# Patient Record
Sex: MALE | Race: WHITE | NOT HISPANIC OR LATINO | Employment: OTHER | URBAN - METROPOLITAN AREA
[De-identification: names, ages, dates, MRNs, and addresses within clinical notes are randomized per-mention and may not be internally consistent; named-entity substitution may affect disease eponyms.]

---

## 2024-07-09 ENCOUNTER — APPOINTMENT (OUTPATIENT)
Dept: GENERAL RADIOLOGY | Facility: HOSPITAL | Age: 84
DRG: 640 | End: 2024-07-09
Payer: MEDICARE

## 2024-07-09 ENCOUNTER — HOSPITAL ENCOUNTER (INPATIENT)
Facility: HOSPITAL | Age: 84
LOS: 16 days | Discharge: NURSING, REHAB OR LONG-TERM CARE | DRG: 640 | End: 2024-07-26
Attending: EMERGENCY MEDICINE | Admitting: INTERNAL MEDICINE
Payer: MEDICARE

## 2024-07-09 DIAGNOSIS — Z78.9 DECREASED ACTIVITIES OF DAILY LIVING (ADL): ICD-10-CM

## 2024-07-09 DIAGNOSIS — Z51.5 COMFORT MEASURES ONLY STATUS: ICD-10-CM

## 2024-07-09 DIAGNOSIS — R26.2 DIFFICULTY IN WALKING: ICD-10-CM

## 2024-07-09 DIAGNOSIS — N30.00 ACUTE CYSTITIS WITHOUT HEMATURIA: Primary | ICD-10-CM

## 2024-07-09 PROBLEM — N39.0 UTI (URINARY TRACT INFECTION): Status: ACTIVE | Noted: 2024-07-09

## 2024-07-09 LAB
ALBUMIN SERPL-MCNC: 3.4 G/DL (ref 3.5–5.2)
ALBUMIN/GLOB SERPL: 1.1 G/DL
ALP SERPL-CCNC: 95 U/L (ref 39–117)
ALT SERPL W P-5'-P-CCNC: 7 U/L (ref 1–41)
ANION GAP SERPL CALCULATED.3IONS-SCNC: 8.3 MMOL/L (ref 5–15)
AST SERPL-CCNC: 13 U/L (ref 1–40)
BACTERIA UR QL AUTO: ABNORMAL /HPF
BASOPHILS # BLD AUTO: 0.03 10*3/MM3 (ref 0–0.2)
BASOPHILS NFR BLD AUTO: 0.5 % (ref 0–1.5)
BILIRUB SERPL-MCNC: <0.2 MG/DL (ref 0–1.2)
BILIRUB UR QL STRIP: NEGATIVE
BUN SERPL-MCNC: 27 MG/DL (ref 8–23)
BUN/CREAT SERPL: 31 (ref 7–25)
CALCIUM SPEC-SCNC: 8.8 MG/DL (ref 8.6–10.5)
CHLORIDE SERPL-SCNC: 105 MMOL/L (ref 98–107)
CLARITY UR: ABNORMAL
CO2 SERPL-SCNC: 27.7 MMOL/L (ref 22–29)
COLOR UR: YELLOW
CREAT SERPL-MCNC: 0.87 MG/DL (ref 0.76–1.27)
DEPRECATED RDW RBC AUTO: 51.4 FL (ref 37–54)
EGFRCR SERPLBLD CKD-EPI 2021: 85.6 ML/MIN/1.73
EOSINOPHIL # BLD AUTO: 0.07 10*3/MM3 (ref 0–0.4)
EOSINOPHIL NFR BLD AUTO: 1.1 % (ref 0.3–6.2)
ERYTHROCYTE [DISTWIDTH] IN BLOOD BY AUTOMATED COUNT: 14.1 % (ref 12.3–15.4)
GLOBULIN UR ELPH-MCNC: 3.1 GM/DL
GLUCOSE SERPL-MCNC: 104 MG/DL (ref 65–99)
GLUCOSE UR STRIP-MCNC: NEGATIVE MG/DL
HCT VFR BLD AUTO: 34.6 % (ref 37.5–51)
HGB BLD-MCNC: 11.1 G/DL (ref 13–17.7)
HGB UR QL STRIP.AUTO: ABNORMAL
HOLD SPECIMEN: NORMAL
HOLD SPECIMEN: NORMAL
HYALINE CASTS UR QL AUTO: ABNORMAL /LPF
IMM GRANULOCYTES # BLD AUTO: 0.01 10*3/MM3 (ref 0–0.05)
IMM GRANULOCYTES NFR BLD AUTO: 0.2 % (ref 0–0.5)
KETONES UR QL STRIP: NEGATIVE
LEUKOCYTE ESTERASE UR QL STRIP.AUTO: ABNORMAL
LYMPHOCYTES # BLD AUTO: 1.62 10*3/MM3 (ref 0.7–3.1)
LYMPHOCYTES NFR BLD AUTO: 25.5 % (ref 19.6–45.3)
MAGNESIUM SERPL-MCNC: 2.2 MG/DL (ref 1.6–2.4)
MCH RBC QN AUTO: 31.8 PG (ref 26.6–33)
MCHC RBC AUTO-ENTMCNC: 32.1 G/DL (ref 31.5–35.7)
MCV RBC AUTO: 99.1 FL (ref 79–97)
MONOCYTES # BLD AUTO: 0.78 10*3/MM3 (ref 0.1–0.9)
MONOCYTES NFR BLD AUTO: 12.3 % (ref 5–12)
NEUTROPHILS NFR BLD AUTO: 3.84 10*3/MM3 (ref 1.7–7)
NEUTROPHILS NFR BLD AUTO: 60.4 % (ref 42.7–76)
NITRITE UR QL STRIP: POSITIVE
NRBC BLD AUTO-RTO: 0 /100 WBC (ref 0–0.2)
PH UR STRIP.AUTO: 6.5 [PH] (ref 5–8)
PLATELET # BLD AUTO: 180 10*3/MM3 (ref 140–450)
PMV BLD AUTO: 9.7 FL (ref 6–12)
POTASSIUM SERPL-SCNC: 4.4 MMOL/L (ref 3.5–5.2)
PROT SERPL-MCNC: 6.5 G/DL (ref 6–8.5)
PROT UR QL STRIP: ABNORMAL
RBC # BLD AUTO: 3.49 10*6/MM3 (ref 4.14–5.8)
RBC # UR STRIP: ABNORMAL /HPF
REF LAB TEST METHOD: ABNORMAL
SODIUM SERPL-SCNC: 141 MMOL/L (ref 136–145)
SP GR UR STRIP: 1.02 (ref 1–1.03)
SQUAMOUS #/AREA URNS HPF: ABNORMAL /HPF
TROPONIN T SERPL HS-MCNC: 38 NG/L
TROPONIN T SERPL HS-MCNC: 43 NG/L
UROBILINOGEN UR QL STRIP: ABNORMAL
WBC # UR STRIP: ABNORMAL /HPF
WBC NRBC COR # BLD AUTO: 6.35 10*3/MM3 (ref 3.4–10.8)
WHOLE BLOOD HOLD COAG: NORMAL
WHOLE BLOOD HOLD SPECIMEN: NORMAL

## 2024-07-09 PROCEDURE — 87186 SC STD MICRODIL/AGAR DIL: CPT

## 2024-07-09 PROCEDURE — 80053 COMPREHEN METABOLIC PANEL: CPT | Performed by: EMERGENCY MEDICINE

## 2024-07-09 PROCEDURE — 93005 ELECTROCARDIOGRAM TRACING: CPT

## 2024-07-09 PROCEDURE — 85025 COMPLETE CBC W/AUTO DIFF WBC: CPT | Performed by: EMERGENCY MEDICINE

## 2024-07-09 PROCEDURE — 93010 ELECTROCARDIOGRAM REPORT: CPT | Performed by: SPECIALIST

## 2024-07-09 PROCEDURE — 83735 ASSAY OF MAGNESIUM: CPT | Performed by: EMERGENCY MEDICINE

## 2024-07-09 PROCEDURE — 82607 VITAMIN B-12: CPT | Performed by: INTERNAL MEDICINE

## 2024-07-09 PROCEDURE — 71045 X-RAY EXAM CHEST 1 VIEW: CPT

## 2024-07-09 PROCEDURE — 84484 ASSAY OF TROPONIN QUANT: CPT | Performed by: EMERGENCY MEDICINE

## 2024-07-09 PROCEDURE — 93005 ELECTROCARDIOGRAM TRACING: CPT | Performed by: EMERGENCY MEDICINE

## 2024-07-09 PROCEDURE — 99223 1ST HOSP IP/OBS HIGH 75: CPT | Performed by: STUDENT IN AN ORGANIZED HEALTH CARE EDUCATION/TRAINING PROGRAM

## 2024-07-09 PROCEDURE — 87086 URINE CULTURE/COLONY COUNT: CPT

## 2024-07-09 PROCEDURE — 84484 ASSAY OF TROPONIN QUANT: CPT

## 2024-07-09 PROCEDURE — 81001 URINALYSIS AUTO W/SCOPE: CPT | Performed by: EMERGENCY MEDICINE

## 2024-07-09 PROCEDURE — 99285 EMERGENCY DEPT VISIT HI MDM: CPT

## 2024-07-09 PROCEDURE — 36415 COLL VENOUS BLD VENIPUNCTURE: CPT | Performed by: EMERGENCY MEDICINE

## 2024-07-09 PROCEDURE — 84443 ASSAY THYROID STIM HORMONE: CPT | Performed by: INTERNAL MEDICINE

## 2024-07-09 RX ORDER — FERROUS GLUCONATE 324(38)MG
324 TABLET ORAL DAILY
COMMUNITY
Start: 2024-02-06 | End: 2024-07-26 | Stop reason: HOSPADM

## 2024-07-09 RX ORDER — SODIUM CHLORIDE 0.9 % (FLUSH) 0.9 %
10 SYRINGE (ML) INJECTION AS NEEDED
Status: DISCONTINUED | OUTPATIENT
Start: 2024-07-09 | End: 2024-07-16

## 2024-07-09 RX ORDER — PANTOPRAZOLE SODIUM 20 MG/1
20 TABLET, DELAYED RELEASE ORAL
COMMUNITY
Start: 2024-05-17

## 2024-07-09 RX ORDER — BUDESONIDE AND FORMOTEROL FUMARATE DIHYDRATE 160; 4.5 UG/1; UG/1
2 AEROSOL RESPIRATORY (INHALATION) 2 TIMES DAILY
COMMUNITY
Start: 2024-02-06 | End: 2024-07-26 | Stop reason: HOSPADM

## 2024-07-09 RX ORDER — METOPROLOL SUCCINATE 25 MG/1
25 TABLET, EXTENDED RELEASE ORAL DAILY
COMMUNITY
Start: 2024-05-17 | End: 2024-07-26 | Stop reason: HOSPADM

## 2024-07-09 RX ORDER — GABAPENTIN 100 MG/1
100 CAPSULE ORAL
COMMUNITY
Start: 2024-06-07

## 2024-07-09 RX ORDER — QUETIAPINE FUMARATE 50 MG/1
1.5 TABLET, FILM COATED ORAL NIGHTLY
COMMUNITY
Start: 2024-05-13

## 2024-07-09 NOTE — ED PROVIDER NOTES
Time: 6:12 PM EDT  Date of encounter:  7/9/2024  Independent Historian/Clinical History and Information was obtained by:   Family    History is limited by: N/A    Chief Complaint: Weakness      History of Present Illness:  Patient is a 84 y.o. year old male who presents to the emergency department for evaluation of weakness.  Patient presents with family from home.  Patient currently lives with a caregiver and family visits him but is unable to fully care for him in their home.  Patient has started to have a decreased appetite and has been increasingly confused.  His grandson is his POA and just sold his home therefore the patient does not have a home to go to.  Family is requesting admission for for placement in a nursing facility where he can receive proper care.  Denies fever ENEDINA Gardner FNP-C/ENP    Per nurse and family member states that the POA sold his house and patient lives with his 76-year-old caregiver.  She is no longer able to take care of the patient.  Family member states that he has not been eating as much in has not been taking his medications.  Patient has a history of dementia and is only aware to self.  He did tell his family members and nurse that he misses his wife who passed away and has thoughts of wanting to kill himself but no plan.  He denied SI to me when asked.  Patient denies pain.     HPI    Patient Care Team  Primary Care Provider: Provider, No Known    Past Medical History:     No Known Allergies  Past Medical History:   Diagnosis Date    Dementia      History reviewed. No pertinent surgical history.  History reviewed. No pertinent family history.    Home Medications:  Prior to Admission medications    Not on File        Social History:          Review of Systems:  Review of Systems   Constitutional:  Positive for activity change, appetite change and unexpected weight change.   HENT: Negative.     Eyes: Negative.    Respiratory: Negative.     Cardiovascular: Negative.     Gastrointestinal: Negative.    Endocrine: Negative.    Genitourinary: Negative.    Musculoskeletal: Negative.    Skin: Negative.    Allergic/Immunologic: Negative.    Neurological:  Positive for weakness.   Hematological: Negative.    Psychiatric/Behavioral:  Positive for confusion.         Physical Exam:  BP 99/60 (BP Location: Left arm, Patient Position: Sitting)   Pulse 69   Temp 98.2 °F (36.8 °C) (Oral)   Resp 16   SpO2 99%     Physical Exam  Vitals and nursing note reviewed.   Constitutional:       Appearance: Normal appearance.   HENT:      Head: Normocephalic and atraumatic.      Nose: Nose normal.      Mouth/Throat:      Mouth: Mucous membranes are moist.   Eyes:      Extraocular Movements: Extraocular movements intact.      Conjunctiva/sclera: Conjunctivae normal.      Pupils: Pupils are equal, round, and reactive to light.   Cardiovascular:      Rate and Rhythm: Normal rate and regular rhythm.      Heart sounds: Normal heart sounds.   Pulmonary:      Effort: Pulmonary effort is normal.      Breath sounds: Normal breath sounds.   Musculoskeletal:         General: Normal range of motion.      Cervical back: Normal range of motion and neck supple.   Skin:     General: Skin is warm and dry.      Coloration: Skin is not cyanotic.   Neurological:      General: No focal deficit present.      Mental Status: He is alert and oriented to person, place, and time.   Psychiatric:         Attention and Perception: Attention and perception normal.         Mood and Affect: Mood normal.         Behavior: Behavior normal.               Procedures:  Procedures      Medical Decision Making:      Comorbidities that affect care:    Anemia, Chronic Kidney Disease    External Notes reviewed:    Previous Clinic Note: Office visit with internal medicine 5/29/2024      The following orders were placed and all results were independently analyzed by me:  Orders Placed This Encounter   Procedures    Blood Culture - Blood,    Blood  Culture - Blood,    Urine Culture - Urine,    XR Chest 1 View    [place] Draw    Comprehensive Metabolic Panel    Single High Sensitivity Troponin T    Magnesium    Urinalysis With Microscopic If Indicated (No Culture) - Urine, Clean Catch    CBC Auto Differential    Single High Sensitivity Troponin T    Urinalysis, Microscopic Only - Urine, Clean Catch    Lactic Acid, Plasma    NPO Diet NPO Type: Strict NPO    Undress & Gown    Continuous Pulse Oximetry    Vital Signs    Orthostatic Blood Pressure    Inpatient Hospitalist Consult    Oxygen Therapy- Nasal Cannula; Titrate 1-6 LPM Per SpO2; 90 - 95%    POC Glucose Once    ECG 12 Lead ED Triage Standing Order; Weak / Dizzy / AMS    Insert Peripheral IV    Initiate Observation Status    Fall Precautions    CBC & Differential    Green Top (Gel)    Lavender Top    Gold Top - SST    Light Blue Top       Medications Given in the Emergency Department:  Medications   sodium chloride 0.9 % flush 10 mL (has no administration in time range)   lactated ringers bolus 500 mL (has no administration in time range)   cefTRIAXone (ROCEPHIN) 1,000 mg in sodium chloride 0.9 % 100 mL IVPB-VTB (has no administration in time range)   sertraline (ZOLOFT) tablet 50 mg (has no administration in time range)        ED Course:    ED Course as of 07/09/24 2350   Tue Jul 09, 2024 1810 -- PROVIDER IN TRIAGE NOTE ---    The patient was evaluated by me, ENEDINA Walters, in triage. Orders were placed and the patient is currently awaiting disposition.    [CB]   2128 Grandson who is the POA came into the room while I was walking into the room to assess the patient.  Another family member started getting a fight. [AJ]      ED Course User Index  [AJ] Leona Pang PA-C  [CB] Regina Myrick APRN       Labs:    Lab Results (last 24 hours)       Procedure Component Value Units Date/Time    CBC & Differential [[id number]]  (Abnormal) Collected: 07/09/24 1825    Specimen: Blood from Arm,  Right Updated: 07/09/24 1838    Narrative:      The following orders were created for panel order CBC & Differential.  Procedure                               Abnormality         Status                     ---------                               -----------         ------                     CBC Auto Differential[[id number]]        Abnormal            Final result                 Please view results for these tests on the individual orders.    Comprehensive Metabolic Panel [[id number]]  (Abnormal) Collected: 07/09/24 1825    Specimen: Blood from Arm, Right Updated: 07/09/24 1857     Glucose 104 mg/dL      BUN 27 mg/dL      Creatinine 0.87 mg/dL      Sodium 141 mmol/L      Potassium 4.4 mmol/L      Chloride 105 mmol/L      CO2 27.7 mmol/L      Calcium 8.8 mg/dL      Total Protein 6.5 g/dL      Albumin 3.4 g/dL      ALT (SGPT) 7 U/L      AST (SGOT) 13 U/L      Alkaline Phosphatase 95 U/L      Total Bilirubin <0.2 mg/dL      Globulin 3.1 gm/dL      A/G Ratio 1.1 g/dL      BUN/Creatinine Ratio 31.0     Anion Gap 8.3 mmol/L      eGFR 85.6 mL/min/1.73     Narrative:      GFR Normal >60  Chronic Kidney Disease <60  Kidney Failure <15    The GFR formula is only valid for adults with stable renal function between ages 18 and 70.    Single High Sensitivity Troponin T [[id number]]  (Abnormal) Collected: 07/09/24 1825    Specimen: Blood from Arm, Right Updated: 07/09/24 1857     HS Troponin T 43 ng/L     Narrative:      High Sensitive Troponin T Reference Range:  <14.0 ng/L- Negative Female for AMI  <22.0 ng/L- Negative Male for AMI  >=14 - Abnormal Female indicating possible myocardial injury.  >=22 - Abnormal Male indicating possible myocardial injury.   Clinicians would have to utilize clinical acumen, EKG, Troponin, and serial changes to determine if it is an Acute Myocardial Infarction or myocardial injury due to an underlying chronic condition.         Magnesium [[id number]]  (Normal) Collected: 07/09/24 1825    Specimen:  Blood from Arm, Right Updated: 07/09/24 1857     Magnesium 2.2 mg/dL     CBC Auto Differential [[id number]]  (Abnormal) Collected: 07/09/24 1825    Specimen: Blood from Arm, Right Updated: 07/09/24 1838     WBC 6.35 10*3/mm3      RBC 3.49 10*6/mm3      Hemoglobin 11.1 g/dL      Hematocrit 34.6 %      MCV 99.1 fL      MCH 31.8 pg      MCHC 32.1 g/dL      RDW 14.1 %      RDW-SD 51.4 fl      MPV 9.7 fL      Platelets 180 10*3/mm3      Neutrophil % 60.4 %      Lymphocyte % 25.5 %      Monocyte % 12.3 %      Eosinophil % 1.1 %      Basophil % 0.5 %      Immature Grans % 0.2 %      Neutrophils, Absolute 3.84 10*3/mm3      Lymphocytes, Absolute 1.62 10*3/mm3      Monocytes, Absolute 0.78 10*3/mm3      Eosinophils, Absolute 0.07 10*3/mm3      Basophils, Absolute 0.03 10*3/mm3      Immature Grans, Absolute 0.01 10*3/mm3      nRBC 0.0 /100 WBC     Urinalysis With Microscopic If Indicated (No Culture) - Urine, Clean Catch [[id number]]  (Abnormal) Collected: 07/09/24 2133    Specimen: Urine, Clean Catch Updated: 07/09/24 2148     Color, UA Yellow     Appearance, UA Cloudy     pH, UA 6.5     Specific Gravity, UA 1.021     Glucose, UA Negative     Ketones, UA Negative     Bilirubin, UA Negative     Blood, UA Moderate (2+)     Protein, UA 30 mg/dL (1+)     Leuk Esterase, UA Large (3+)     Nitrite, UA Positive     Urobilinogen, UA 0.2 E.U./dL    Urinalysis, Microscopic Only - Urine, Clean Catch [[id number]]  (Abnormal) Collected: 07/09/24 2133    Specimen: Urine, Clean Catch Updated: 07/09/24 2148     RBC, UA Too Numerous to Count /HPF      WBC, UA Too Numerous to Count /HPF      Bacteria, UA 4+ /HPF      Squamous Epithelial Cells, UA 0-2 /HPF      Hyaline Casts, UA 0-2 /LPF      Methodology Automated Microscopy    Urine Culture - Urine, Urine, Clean Catch [[id number]] Collected: 07/09/24 2133    Specimen: Urine, Clean Catch Updated: 07/09/24 2234    Single High Sensitivity Troponin T [[id number]]  (Abnormal) Collected: 07/09/24 2202     Specimen: Blood from Arm, Right Updated: 07/09/24 2237     HS Troponin T 38 ng/L     Narrative:      High Sensitive Troponin T Reference Range:  <14.0 ng/L- Negative Female for AMI  <22.0 ng/L- Negative Male for AMI  >=14 - Abnormal Female indicating possible myocardial injury.  >=22 - Abnormal Male indicating possible myocardial injury.   Clinicians would have to utilize clinical acumen, EKG, Troponin, and serial changes to determine if it is an Acute Myocardial Infarction or myocardial injury due to an underlying chronic condition.                  Imaging:    XR Chest 1 View    Result Date: 7/9/2024  XR CHEST 1 VW Date of Exam: 7/9/2024 7:19 PM EDT Indication: Weak/Dizzy/AMS triage protocol Comparison: None available. Findings: There are no definite airspace consolidations. No hyperinflation. Innumerable tiny punctate bilateral calcified granulomas. There is biapical pleural thickening. No pleural fluid. No pneumothorax. The pulmonary vasculature appears within normal limits. The cardiac and mediastinal silhouette appear unremarkable. No acute osseous abnormality identified.     Impression: No acute pulmonary process Electronically Signed: Tomasz Harris MD  7/9/2024 7:37 PM EDT  Workstation ID: [id number]       Differential Diagnosis and Discussion:    Metabolic: Differential diagnosis includes but is not limited to hypertension, hyperglycemia, hyperkalemia, hypocalcemia, metabolic acidosis, hypokalemia, hypoglycemia, malnutrition, hypothyroidism, hyperthyroidism, and adrenal insufficiency.   Weakness: Based on the patient's history, signs, and symptoms, the diffential diagnosis includes but is not limited to meningitis, stroke, sepsis, subarachnoid hemorrhage, intracranial bleeding, encephalitis, acute uti, dehydration, MS, myasthenia gravis, [place], migraine variant, neuromuscular disorders vertigo, electrolyte imbalance, and metabolic disorders.    All labs were reviewed and interpreted by me.  All  X-rays impressions were independently interpreted by me.  EKG was interpreted by me.  EKG was interpreted by supervising attending.    MDM     Amount and/or Complexity of Data Reviewed  Clinical lab tests: reviewed  Decide to obtain previous medical records or to obtain history from someone other than the patient: yes                 Patient Care Considerations:    SEPSIS was considered but is NOT present in the emergency department as SIRS criteria is not present.      Consultants/Shared Management Plan:    Hospitalist: I have discussed the case with Dr. Burgess who agrees to accept the patient for admission.    Social Determinants of Health:    Patient has presented with family members who are responsible, reliable and will ensure follow up care.      Disposition and Care Coordination:    Admit:   Through independent evaluation of the patient's history, physical, and imperical data, the patient meets criteria for inpatient admission to the hospital.        Final diagnoses:   Acute cystitis without hematuria        ED Disposition       ED Disposition   Decision to Admit    Condition   --    Comment   Level of Care: Remote Telemetry [26]   Diagnosis: UTI (urinary tract infection) [383223]                 This medical record created using voice recognition software.             Leona Pang PA-C  07/09/24 3168

## 2024-07-09 NOTE — ED NOTES
Pt presents to ed with family reporting he has not been eating or taking his medications and that is losing weight. Pt has dementia, pt is unable to perform ADL's. Pt is assist x2. Pt is incontinent. Pt is alert and oriented to self only.     Family reports he is needing 24 hour care that they are unable to provide. Pt had a caregiver but they are unable to provide 24 hour care due to their age.     Family reports pt's grandson is his POA and has sold the pt's home and is spending all his money and won't help pt. Family is requesting to speak with SW. Family reported they have also called [facility] and a case is being opened.     Notified SW.

## 2024-07-10 ENCOUNTER — APPOINTMENT (OUTPATIENT)
Dept: CT IMAGING | Facility: HOSPITAL | Age: 84
DRG: 640 | End: 2024-07-10
Payer: MEDICARE

## 2024-07-10 LAB
QT INTERVAL: 386 MS
QTC INTERVAL: 432 MS
TSH SERPL DL<=0.05 MIU/L-ACNC: 2.55 UIU/ML (ref 0.27–4.2)
VIT B12 BLD-MCNC: 717 PG/ML (ref 211–946)

## 2024-07-10 PROCEDURE — 99232 SBSQ HOSP IP/OBS MODERATE 35: CPT | Performed by: INTERNAL MEDICINE

## 2024-07-10 PROCEDURE — 70450 CT HEAD/BRAIN W/O DYE: CPT

## 2024-07-10 RX ORDER — QUETIAPINE FUMARATE 25 MG/1
75 TABLET, FILM COATED ORAL NIGHTLY
Status: DISCONTINUED | OUTPATIENT
Start: 2024-07-10 | End: 2024-07-16

## 2024-07-10 RX ORDER — HEPARIN SODIUM 5000 [USP'U]/ML
5000 INJECTION, SOLUTION INTRAVENOUS; SUBCUTANEOUS EVERY 8 HOURS SCHEDULED
Status: DISCONTINUED | OUTPATIENT
Start: 2024-07-10 | End: 2024-07-16

## 2024-07-10 RX ORDER — SODIUM CHLORIDE 0.9 % (FLUSH) 0.9 %
10 SYRINGE (ML) INJECTION AS NEEDED
Status: DISCONTINUED | OUTPATIENT
Start: 2024-07-10 | End: 2024-07-16

## 2024-07-10 RX ORDER — NICOTINE POLACRILEX 4 MG
15 LOZENGE BUCCAL
Status: DISCONTINUED | OUTPATIENT
Start: 2024-07-10 | End: 2024-07-16

## 2024-07-10 RX ORDER — METOPROLOL SUCCINATE 25 MG/1
25 TABLET, EXTENDED RELEASE ORAL DAILY
Status: DISCONTINUED | OUTPATIENT
Start: 2024-07-10 | End: 2024-07-16

## 2024-07-10 RX ORDER — AMOXICILLIN 250 MG
2 CAPSULE ORAL 2 TIMES DAILY PRN
Status: DISCONTINUED | OUTPATIENT
Start: 2024-07-10 | End: 2024-07-26 | Stop reason: HOSPADM

## 2024-07-10 RX ORDER — POLYETHYLENE GLYCOL 3350 17 G/17G
17 POWDER, FOR SOLUTION ORAL DAILY PRN
Status: DISCONTINUED | OUTPATIENT
Start: 2024-07-10 | End: 2024-07-26 | Stop reason: HOSPADM

## 2024-07-10 RX ORDER — SODIUM CHLORIDE 9 MG/ML
40 INJECTION, SOLUTION INTRAVENOUS AS NEEDED
Status: DISCONTINUED | OUTPATIENT
Start: 2024-07-10 | End: 2024-07-16

## 2024-07-10 RX ORDER — BISACODYL 5 MG/1
5 TABLET, DELAYED RELEASE ORAL DAILY PRN
Status: DISCONTINUED | OUTPATIENT
Start: 2024-07-10 | End: 2024-07-26 | Stop reason: HOSPADM

## 2024-07-10 RX ORDER — SODIUM CHLORIDE 0.9 % (FLUSH) 0.9 %
10 SYRINGE (ML) INJECTION EVERY 12 HOURS SCHEDULED
Status: DISCONTINUED | OUTPATIENT
Start: 2024-07-10 | End: 2024-07-16

## 2024-07-10 RX ORDER — DEXTROSE MONOHYDRATE 25 G/50ML
25 INJECTION, SOLUTION INTRAVENOUS
Status: DISCONTINUED | OUTPATIENT
Start: 2024-07-10 | End: 2024-07-16

## 2024-07-10 RX ORDER — PANTOPRAZOLE SODIUM 20 MG/1
20 TABLET, DELAYED RELEASE ORAL
Status: DISCONTINUED | OUTPATIENT
Start: 2024-07-10 | End: 2024-07-16

## 2024-07-10 RX ORDER — FERROUS SULFATE 325(65) MG
325 TABLET ORAL
Status: DISCONTINUED | OUTPATIENT
Start: 2024-07-10 | End: 2024-07-16

## 2024-07-10 RX ORDER — BISACODYL 10 MG
10 SUPPOSITORY, RECTAL RECTAL DAILY PRN
Status: DISCONTINUED | OUTPATIENT
Start: 2024-07-10 | End: 2024-07-26 | Stop reason: HOSPADM

## 2024-07-10 RX ORDER — GABAPENTIN 100 MG/1
100 CAPSULE ORAL DAILY
Status: DISCONTINUED | OUTPATIENT
Start: 2024-07-10 | End: 2024-07-16

## 2024-07-10 RX ORDER — IBUPROFEN 600 MG/1
1 TABLET ORAL
Status: DISCONTINUED | OUTPATIENT
Start: 2024-07-10 | End: 2024-07-16

## 2024-07-10 RX ADMIN — SERTRALINE HYDROCHLORIDE 50 MG: 50 TABLET ORAL at 00:25

## 2024-07-10 NOTE — ED NOTES
"PT CONTINUES TO REFUSE BLOOD DRAW OR AN IV AT THIS TIME. PT BECOMES FRUSTRATED AND ANGRY WHEN ASKED AND KEEPS SAYING \"NO, I DON'T WANT IT.\" ATTEMPTED TO EDUCATE PT ON IMPORTANCE OF BLOOD DRAW AND IV MEDICATION. PT IS ALERT AND ORIENTED TO SELF,  AND WHERE HE IS.  "

## 2024-07-10 NOTE — PLAN OF CARE
Goal Outcome Evaluation:  Plan of Care Reviewed With: patient        Progress: improving     Pt. Arrived from the ED with no IV access.  Pt educated on importance of IV access and being compliant with medications.  Pt still refuses IV access, lab sticks, and medications.  Pt. Assessed and tucked into bed. Will continue to educate and stress importance of treatment if he remains in the hospital.

## 2024-07-10 NOTE — ED NOTES
ATTEMPTED TO GO START AN IV TO DRAW LABS AND START MEDICATIONS THAT HAVE BEEN ORDERED. PT REFUSED AND STARTED TO BECOME ANGRY. TRIED TO CALM PT AND EXPLAIN AND HE CONTINUED TO REFUSE.    NOTIFIED NORMA CASTILLO.    ATTEMPTING TO GET AN UPDATED MEDICATION LIST FOR PT

## 2024-07-10 NOTE — PLAN OF CARE
Goal Outcome Evaluation:  Plan of Care Reviewed With: patient, grandchild(dara)        Progress: no change  Outcome Evaluation: pt educated on importance of adhering to plan of care and taking medications. Pt continues to refuse any treatment despite education. MD notified. Palliative consulted. Natalie who is also POA, states that pt has been refusing medications at home months prior to current hospital admission. POA also states that pt has nowhere to go once d/c from hospital or anyone to care for him. Pt and grandson also stated the patient wishes are to be DNR/DNI. Md notified. No complaints of pain this shift. Pt repositioned for comfort throughout shift and incontinence care provided.

## 2024-07-10 NOTE — PROGRESS NOTES
"DAILY PROGRESS NOTE  HOSPITALIST GROUP      PATIENT IDENTIFICATION    Name: Fermín Junior  :  1940  MRN: [id number]    CHIEF COMPLAINT/PRINCIPAL DIAGNOSIS: UTI (urinary tract infection)       SUBJECTIVE    Denies any pain or sob. Eating some    ROS:   Gen: No fever or chills  CV: no chest pain or palpitation  Resp: no shortness of breath or cough  GI: no nausea, vomiting, diarrhea  Neuro: no headache or dizziness     OBJECTIVE     Exam:  /76 (BP Location: Left arm, Patient Position: Lying)   Pulse 68   Temp 98.1 °F (36.7 °C) (Oral)   Resp 18   Ht 177.8 cm (70\")   Wt 57 kg (125 lb 10.6 oz)   SpO2 100%   BMI 18.03 kg/m²   Intake/Output last 24 hours:  No intake or output data in the 24 hours ending 07/10/24 08     Gen: NAD, up in bed  Resp: CTAB, no increased work of breathing  CV: RRR, no m/r/g. No peripheral edema  GI: Soft, nontender, (+) BS. nondistended  Psych: Alert and Oriented x 1, Mood and affect appropriate to situation  Skin: warm and dry on palpation. No rash on inspection.  Neuro: moves all 4 extremities, follows simple commands    DATA REVIEW:  Lab Results (last 24 hours)       Procedure Component Value Units Date/Time    Single High Sensitivity Troponin T [[id number]]  (Abnormal) Collected: 24    Specimen: Blood from Arm, Right Updated: 24     HS Troponin T 38 ng/L     Narrative:      High Sensitive Troponin T Reference Range:  <14.0 ng/L- Negative Female for AMI  <22.0 ng/L- Negative Male for AMI  >=14 - Abnormal Female indicating possible myocardial injury.  >=22 - Abnormal Male indicating possible myocardial injury.   Clinicians would have to utilize clinical acumen, EKG, Troponin, and serial changes to determine if it is an Acute Myocardial Infarction or myocardial injury due to an underlying chronic condition.         Urine Culture - Urine, Urine, Clean Catch [[id number]] Collected: 24    Specimen: Urine, Clean Catch Updated: 24    " Urinalysis With Microscopic If Indicated (No Culture) - Urine, Clean Catch [[id number]]  (Abnormal) Collected: 07/09/24 2133    Specimen: Urine, Clean Catch Updated: 07/09/24 2148     Color, UA Yellow     Appearance, UA Cloudy     pH, UA 6.5     Specific Gravity, UA 1.021     Glucose, UA Negative     Ketones, UA Negative     Bilirubin, UA Negative     Blood, UA Moderate (2+)     Protein, UA 30 mg/dL (1+)     Leuk Esterase, UA Large (3+)     Nitrite, UA Positive     Urobilinogen, UA 0.2 E.U./dL    Urinalysis, Microscopic Only - Urine, Clean Catch [[id number]]  (Abnormal) Collected: 07/09/24 2133    Specimen: Urine, Clean Catch Updated: 07/09/24 2148     RBC, UA Too Numerous to Count /HPF      WBC, UA Too Numerous to Count /HPF      Bacteria, UA 4+ /HPF      Squamous Epithelial Cells, UA 0-2 /HPF      Hyaline Casts, UA 0-2 /LPF      Methodology Automated Microscopy    Comprehensive Metabolic Panel [[id number]]  (Abnormal) Collected: 07/09/24 1825    Specimen: Blood from Arm, Right Updated: 07/09/24 1857     Glucose 104 mg/dL      BUN 27 mg/dL      Creatinine 0.87 mg/dL      Sodium 141 mmol/L      Potassium 4.4 mmol/L      Chloride 105 mmol/L      CO2 27.7 mmol/L      Calcium 8.8 mg/dL      Total Protein 6.5 g/dL      Albumin 3.4 g/dL      ALT (SGPT) 7 U/L      AST (SGOT) 13 U/L      Alkaline Phosphatase 95 U/L      Total Bilirubin <0.2 mg/dL      Globulin 3.1 gm/dL      A/G Ratio 1.1 g/dL      BUN/Creatinine Ratio 31.0     Anion Gap 8.3 mmol/L      eGFR 85.6 mL/min/1.73     Narrative:      GFR Normal >60  Chronic Kidney Disease <60  Kidney Failure <15    The GFR formula is only valid for adults with stable renal function between ages 18 and 70.    Magnesium [[id number]]  (Normal) Collected: 07/09/24 1825    Specimen: Blood from Arm, Right Updated: 07/09/24 1857     Magnesium 2.2 mg/dL     Single High Sensitivity Troponin T [[id number]]  (Abnormal) Collected: 07/09/24 1825    Specimen: Blood from Arm, Right Updated:  07/09/24 1857     HS Troponin T 43 ng/L     Narrative:      High Sensitive Troponin T Reference Range:  <14.0 ng/L- Negative Female for AMI  <22.0 ng/L- Negative Male for AMI  >=14 - Abnormal Female indicating possible myocardial injury.  >=22 - Abnormal Male indicating possible myocardial injury.   Clinicians would have to utilize clinical acumen, EKG, Troponin, and serial changes to determine if it is an Acute Myocardial Infarction or myocardial injury due to an underlying chronic condition.         [place] Draw [[id number]] Collected: 07/09/24 1825    Specimen: Blood from Arm, Right Updated: 07/09/24 1846    Narrative:      The following orders were created for panel order [place] Draw.  Procedure                               Abnormality         Status                     ---------                               -----------         ------                     Green Top (Gel)[[id number]]                                  Final result               Lavender Top[[id number]]                                     Final result               Gold Top - SST[[id number]]                                   Final result               Light Blue Top[[id number]]                                   Final result                 Please view results for these tests on the individual orders.    Lavender Top [[id number]] Collected: 07/09/24 1825    Specimen: Blood from Arm, Right Updated: 07/09/24 1846     Extra Tube Hold Specimen for Add Ons    Green Top (Gel) [[id number]] Collected: 07/09/24 1825    Specimen: Blood from Arm, Right Updated: 07/09/24 1846     Extra Tube Hold for add-ons.     Comment: Auto resulted.       Gold Top - SST [[id number]] Collected: 07/09/24 1825    Specimen: Blood from Arm, Right Updated: 07/09/24 1846     Extra Tube Hold for add-ons.     Comment: Auto resulted.       Light Blue Top [[id number]] Collected: 07/09/24 1825    Specimen: Blood from Arm, Right Updated: 07/09/24 1846     Extra Tube Hold for add-ons.      Comment: Auto resulted       CBC & Differential [[id number]]  (Abnormal) Collected: 07/09/24 1825    Specimen: Blood from Arm, Right Updated: 07/09/24 1838    Narrative:      The following orders were created for panel order CBC & Differential.  Procedure                               Abnormality         Status                     ---------                               -----------         ------                     CBC Auto Differential[[id number]]        Abnormal            Final result                 Please view results for these tests on the individual orders.    CBC Auto Differential [[id number]]  (Abnormal) Collected: 07/09/24 1825    Specimen: Blood from Arm, Right Updated: 07/09/24 1838     WBC 6.35 10*3/mm3      RBC 3.49 10*6/mm3      Hemoglobin 11.1 g/dL      Hematocrit 34.6 %      MCV 99.1 fL      MCH 31.8 pg      MCHC 32.1 g/dL      RDW 14.1 %      RDW-SD 51.4 fl      MPV 9.7 fL      Platelets 180 10*3/mm3      Neutrophil % 60.4 %      Lymphocyte % 25.5 %      Monocyte % 12.3 %      Eosinophil % 1.1 %      Basophil % 0.5 %      Immature Grans % 0.2 %      Neutrophils, Absolute 3.84 10*3/mm3      Lymphocytes, Absolute 1.62 10*3/mm3      Monocytes, Absolute 0.78 10*3/mm3      Eosinophils, Absolute 0.07 10*3/mm3      Basophils, Absolute 0.03 10*3/mm3      Immature Grans, Absolute 0.01 10*3/mm3      nRBC 0.0 /100 WBC             Imaging Results (Last 24 Hours)       Procedure Component Value Units Date/Time    XR Chest 1 View [[id number]] Collected: 07/09/24 1936     Updated: 07/09/24 1939    Narrative:      XR CHEST 1 VW    Date of Exam: 7/9/2024 7:19 PM EDT    Indication: Weak/Dizzy/AMS triage protocol    Comparison: None available.    Findings:  There are no definite airspace consolidations. No hyperinflation. Innumerable tiny punctate bilateral calcified granulomas. There is biapical pleural thickening. No pleural fluid. No pneumothorax. The pulmonary vasculature appears within normal limits.   The cardiac  and mediastinal silhouette appear unremarkable. No acute osseous abnormality identified.       Impression:      Impression:  No acute pulmonary process    Electronically Signed: Tomasz Harris MD    7/9/2024 7:37 PM EDT    Workstation ID: [id number]            Labs and imaging noted above have been personally reviewed by me.    Scheduled Meds:cefTRIAXone, 1,000 mg, Intravenous, Q24H  ferrous sulfate, 325 mg, Oral, Daily With Breakfast  gabapentin, 100 mg, Oral, Daily  heparin (porcine), 5,000 Units, Subcutaneous, Q8H  lactated ringers, 500 mL, Intravenous, Once  metoprolol succinate XL, 25 mg, Oral, Daily  pantoprazole, 20 mg, Oral, Q AM  QUEtiapine, 75 mg, Oral, Nightly  sodium chloride, 10 mL, Intravenous, Q12H      Continuous Infusions:   PRN Meds:  senna-docusate sodium **AND** polyethylene glycol **AND** bisacodyl **AND** bisacodyl    dextrose    dextrose    glucagon (human recombinant)    sodium chloride    sodium chloride    sodium chloride    ASSESSMENT/PLAN      UTI (urinary tract infection)    FTT  Encephalopathy on top of Dementia vs progressive dementia  - unclear if his symptoms are related to infection from UTI vs progression of underlying dementia  - home seroquel resumed  - u/a infectious -- see below  - CXR clear  - will check B12 and TSH  - check updated CT Head  - PT/OT eval    HTN  - home toprol resumed    UTI  - u/a infectious, UCx pending  - cont CTX    COPD  - continue home symbicort      Nutrition - Diet: Regular/House; Fluid Consistency: Thin (IDDSI 0)   DVT Prophylaxis - sub-q heparin  Code Status - full   GI ppx - ppi  Disposition - needs placement       Rodger Yo MD  Hospitalist Group  7/10/2024  08:26 EDT

## 2024-07-10 NOTE — SIGNIFICANT NOTE
07/09/24 2052   Plan   Final Note PAOLO met with pts family at bedside at family request per nurse. Pts niece was at bedside with pts sister. Pts niece requested to speak with SW privately. She reports that pt did have a fulltime caregiver, however, she is not able to care for pt any longer due to her own health concerns as she is 77. She reports that pts POA was contacted, however, he stated he could not care for pt either. She reports that she has submitted an APS report earlier this date and they advised her to call them back tomorrow (7/10/24). She reports that they do not what do for him at this time. She reports that he does have dementia and does not understand all of the time. She reports that his POA (his grandson) recently sold pts home for $220,000 and they divided it between three people. She reports that he will not agree to nursing home placement due to assets and pt not having Medicaid to cover the cost. Pts niece and sister are requesting admission this date for place to long term with qualifying stay. She states they have all the information to contact themselves, however, the POA will not do so. PAOLO explained that referral process and POA would need to be in agreement with transition to long term. PAOLO updated nurse this date.

## 2024-07-10 NOTE — H&P
[facility]  Date: 7/10/2024   Patient Name: Fermín Junior  : 1940  MRN: [id number]  Primary Care Physician:  Julio C, No Known  Date of admission: 2024    Subjective   Subjective     Chief Complaint: Poor p.o. intake, failure to thrive    HPI:    Fermín Junior is a 84 y.o. male with a past medical history of dementia, hypertension, anxiety presented to the ED for evaluation of decreased appetite, failure to thrive, not taking his medications.  As per the reports, patient is currently staying at home, unable to perform ADLs and is requiring 2 people assistance.  As per the family, patient is needing 24-hour care and they are unable to provide at home at this time.  POA is patient's grandson and he is now taking care of the patient.  Unable to obtain any history from the patient due to dementia.  He is alert and oriented to self.  Case management has been consulted while the patient was in the ED and had discussion with the family.  Please see the separate note from the  for detailed information.    In the ED, vital signs temperature 98.1 pulse 77, respiratory 16, blood pressure 99/60 on room air saturating at 99%.  Initial troponin 43, repeat troponin 38, CMP with no significant findings, normal WBC, hemoglobin 11.1, normal platelets.  Hemoglobin stable in the last 6 months.  Urinalysis showed 4+ bacteria, too numerous to count WBC, positive nitrates, large leukocytes.  Urine cultures in process.  Chest x-ray showed no acute abnormality.  Received IV fluids and ceftriaxone in the ED.  Patient has been admitted for further evaluation and management of UTI, worsening dementia, decreased appetite, failure to thrive    Personal History     Past Medical History:   Diagnosis Date   • Dementia          History reviewed. No pertinent surgical history.      History reviewed. No pertinent family history.      Social History     Socioeconomic History   • Marital status:           Home Medications:  QUEtiapine, budesonide-formoterol, ferrous gluconate, gabapentin, metoprolol succinate XL, pantoprazole, and sertraline    Allergies:  Allergies   Allergen Reactions   • Codeine Hives   • Morphine Other (See Comments)   • Penicillins Hives       Review of Systems   Unable to obtain the review of systems due to patient's dementia    Objective   Objective     Vitals:   Temp:  [98.2 °F (36.8 °C)] 98.2 °F (36.8 °C)  Heart Rate:  [66-81] 79  Resp:  [12-16] 16  BP: ()/(52-73) 110/68    Physical Exam    Constitutional: Awake, alert, no acute distress, cachectic   Eyes: Pupils equal, sclerae anicteric, no conjunctival injection   HENT: NCAT, mucous membranes dry   Respiratory: Clear to auscultation bilaterally, nonlabored respirations    Cardiovascular: RRR, no murmurs   Gastrointestinal: Positive bowel sounds, soft, nontender, nondistended   Musculoskeletal: No bilateral ankle edema, no clubbing or cyanosis to extremities   Neurologic: Oriented x 1 to self, speech clear    Result Review    Result Review:  I have personally reviewed the results from the time of this admission to 7/10/2024 00:38 EDT and agree with these findings:  [x]  Laboratory  []  Microbiology  [x]  Radiology  [x]  EKG/Telemetry   []  Cardiology/Vascular   []  Pathology  []  Old records  []  Other:        Imaging Results (Last 24 Hours)       Procedure Component Value Units Date/Time    XR Chest 1 View [[id number]] Collected: 07/09/24 1936     Updated: 07/09/24 1939    Narrative:      XR CHEST 1 VW    Date of Exam: 7/9/2024 7:19 PM EDT    Indication: Weak/Dizzy/AMS triage protocol    Comparison: None available.    Findings:  There are no definite airspace consolidations. No hyperinflation. Innumerable tiny punctate bilateral calcified granulomas. There is biapical pleural thickening. No pleural fluid. No pneumothorax. The pulmonary vasculature appears within normal limits.   The cardiac and mediastinal silhouette  appear unremarkable. No acute osseous abnormality identified.       Impression:      Impression:  No acute pulmonary process    Electronically Signed: Tomasz Harris MD    7/9/2024 7:37 PM EDT    Workstation ID: [id number]             cefTRIAXone, 1,000 mg, Intravenous, Once  lactated ringers, 500 mL, Intravenous, Once         Assessment & Plan   Assessment / Plan     Assessment/Plan:   UTI  Decreased appetite  Failure to thrive  Likely worsening dementia  Need for placement  Hypertension  Anxiety    Plan  Admit to observation, remote telemetry  Continue ceftriaxone  Follow-up on blood cultures, urine cultures  Encourage p.o. intake  Regular diet  Nursing dysphagia screen  Fall precautions  PT OT consult  Case management following up regarding the placement      VTE Prophylaxis:  Pharmacologic VTE prophylaxis orders are signed & held.      CODE STATUS:         Admission Status:  I believe this patient meets observation status.    Part of this note may be an electronic transcription/translation of spoken language to printed text using the Dragon Dictation System    Fatemeh Burgess MD

## 2024-07-11 PROCEDURE — 97161 PT EVAL LOW COMPLEX 20 MIN: CPT

## 2024-07-11 PROCEDURE — 99232 SBSQ HOSP IP/OBS MODERATE 35: CPT | Performed by: INTERNAL MEDICINE

## 2024-07-11 PROCEDURE — 97165 OT EVAL LOW COMPLEX 30 MIN: CPT

## 2024-07-11 NOTE — PLAN OF CARE
Goal Outcome Evaluation:              Outcome Evaluation: VSS, A&O x2, patient is refusing medications and IV,

## 2024-07-11 NOTE — CONSULTS
Purpose of the visit was to evaluate for: goals of care/advanced care planning. Spoke with MD, RN, patient, and [facility] and discussed palliative care, goals of care, and resuscitation status.      Assessment: Patient is currently on 5stu- updated by primary rn regarding patients current condition. During visit patient sitting up in bed- alert and oriented to self only. No family at bedside. Call placed patients POA listed, grandson- James. Introduced self and explained role and purpose of visit. Patients grandson reports he has medical HCS- requested copy for our records- patients grandson to bring copy in today when he visits. Informed patients grandson if he does not visit he can email a picture of acp documents to palliative care RN. Patients grandson v/u. Discussed code status and provided education on cpr/intubation and code event. Patients grandson requests code status change to No CPR/No Intubation. Provided update to patients grandson on patients current condition. Patients grandson reports goal is to find placement for patient as he can no longer be at home. Palliative care to update unit .     Tasks Completed: Code Status clarification and Emotional Support.    Provided palliative care contact information and encouraged to call with further questions/concerns. Palliative care will continue to follow up as needed.    Yenny HOSKINS, RN, PN  Palliative Care

## 2024-07-11 NOTE — PLAN OF CARE
Goal Outcome Evaluation:  Plan of Care Reviewed With: patient        Progress: no change  Outcome Evaluation: Patient presents with limitations in self-care, functional transfers, balance, and endurance. He would benefit from continued skilled occupational therapy services to maximize independence with ADLs/functional transfers.      Anticipated Discharge Disposition (OT): skilled nursing facility

## 2024-07-11 NOTE — PLAN OF CARE
Goal Outcome Evaluation:  Plan of Care Reviewed With: (P) patient        Progress: (P) no change  Outcome Evaluation: (P) Pt presents with complaints of being cold during activity. Pt exhibits deficits in balance, activity tolerance, strength, and cognition effecting functional mobility and safety. Skilled therapy required while in hospital to improve safety with transfers and ambulation.      Anticipated Discharge Disposition (PT): (P) sub acute care setting, skilled nursing facility

## 2024-07-11 NOTE — THERAPY EVALUATION
Acute Care - Physical Therapy Initial Evaluation   Aung     Patient Name: Fermín Junior  : 1940  MRN: [id number]  Today's Date: 2024    Admit date: 2024     Referring Physician: Kane Yo, *     Surgery Date:* No surgery found *           Visit Dx:     ICD-10-CM ICD-9-CM   1. Acute cystitis without hematuria  N30.00 595.0   2. Decreased activities of daily living (ADL)  Z78.9 V49.89   3. Difficulty in walking  R26.2 719.7     Patient Active Problem List   Diagnosis    UTI (urinary tract infection)     Past Medical History:   Diagnosis Date    Dementia      History reviewed. No pertinent surgical history.  PT Assessment (Last 12 Hours)       PT Evaluation and Treatment       Row Name 24 1500          Physical Therapy Time and Intention    Subjective Information complains of (P)   Being cold.  -SJ     Document Type evaluation (P)   -SJ     Mode of Treatment individual therapy;physical therapy (P)   -SJ     Patient Effort adequate (P)   -SJ     Symptoms Noted During/After Treatment fatigue;dizziness (P)   -SJ       Row Name 24 1500          General Information    Patient Profile Reviewed yes (P)   -SJ     Patient Observations alert;cooperative;agree to therapy (P)   -SJ     Prior Level of Function all household mobility;community mobility (P)   Pt lives with caretaker who helps with mobility.  -SJ     Equipment Currently Used at Home walker, rolling;wheelchair;other (see comments) (P)   Rollator  -SJ     Existing Precautions/Restrictions fall (P)   -SJ     Risks Reviewed patient: (P)   -SJ     Barriers to Rehab -- (P)   Caretaker cannot provide care. Looking for nursing home placement.  -SJ       Row Name 24 1500          Living Environment    Current Living Arrangements home (P)   -SJ     Home Accessibility wheelchair accessible (P)   -SJ     People in Home other (see comments) (P)   Caregiver  -SJ     Primary Care Provided by friend (P)   Caregiver  -       Row Name  07/11/24 1500          Home Use of Assistive/Adaptive Equipment    Equipment Currently Used at Home walker, rolling;commode (P)   -SJ       [facility] Name 07/11/24 1500          Pain    Pretreatment Pain Rating 0/10 - no pain (P)   -SJ     Posttreatment Pain Rating 0/10 - no pain (P)   -SJ       [facility] 07/11/24 1500          Cognition    Orientation Status (Cognition) oriented to;person (P)   -SJ       [facility] Name 07/11/24 1500          Range of Motion (ROM)    Range of Motion bilateral lower extremities;ROM is WFL (P)   -SJ       [facility] 07/11/24 1500          Strength (Manual Muscle Testing)    Strength (Manual Muscle Testing) bilateral lower extremities (P)   R LE Hip 4-/5, Knee 4-/5, Ankle 4-/5; L LE Hip 4-/5, Knee 4-/5, Ankle 4-/5  -SJ       [facility] 07/11/24 1500          Transfers    Transfers sit-stand transfer;stand-sit transfer (P)   -SJ     Comment, (Transfers) Pt exhibits retropulsion when comming to stand. Natasha required to maintain weight forward over LINDA. (P)   -SJ       [facility] 07/11/24 1500          Sit-Stand Transfer    Sit-Stand [place] (Transfers) minimum assist (75% patient effort);1 person assist (P)   -SJ     Assistive Device (Sit-Stand Transfers) walker, front-wheeled (P)   -SJ       [facility] 07/11/24 1500          Stand-Sit Transfer    Stand-Sit [place] (Transfers) minimum assist (75% patient effort);1 person assist (P)   -SJ     Assistive Device (Stand-Sit Transfers) walker, front-wheeled (P)   -SJ       [facility] 07/11/24 1500          Gait/Stairs (Locomotion)    Gait/Stairs Locomotion gait/ambulation assistive device (P)   -SJ     [place] Level (Gait) minimum assist (75% patient effort);1 person assist (P)   -SJ     Assistive Device (Gait) walker, front-wheeled (P)   -SJ     Patient was able to Ambulate yes (P)   -SJ     Distance in Feet (Gait) 6 (P)   -SJ     Pattern (Gait) step-to (P)   -SJ     Deviations/Abnormal Patterns (Gait) base of support, narrow;arabella  decreased;festinating/shuffling;gait speed decreased;stride length decreased (P)   -SJ     Bilateral Gait Deviations forward flexed posture (P)   -SJ     Gait Assessment/Intervention Pt able to ambulate short distance in room with RW MinAx1 to maintain balance due to retropulsion observed. Pt had BM so ambulation was cut short. (P)   -SJ       Row Name 07/11/24 1500          Safety Issues, Functional Mobility    Impairments Affecting Function (Mobility) balance;endurance/activity tolerance;cognition;strength (P)   -SJ       Row Name 07/11/24 1500          Balance    Balance Assessment standing dynamic balance (P)   -SJ     Dynamic Standing Balance minimal assist;1-person assist (P)   -SJ     Position/Device Used, Standing Balance supported;walker, front-wheeled (P)   -SJ       Row Name 07/11/24 1500          Plan of Care Review    Plan of Care Reviewed With patient (P)   -SJ     Progress no change (P)   -SJ     Outcome Evaluation Pt presents with complaints of being cold during activity. Pt exhibits deficits in balance, activity tolerance, strength, and cognition effecting functional mobility and safety. Skilled therapy required while in hospital to improve safety with transfers and ambulation. (P)   -SJ       Row Name 07/11/24 1500          Positioning and Restraints    Pre-Treatment Position in bed (P)   -SJ     Post Treatment Position bs (P)   -SJ     On BS commode sitting;with nsg;with other staff (P)   About to receive bath.  -       Row Name 07/11/24 1500          Therapy Assessment/Plan (PT)    Rehab Potential (PT) good, to achieve stated therapy goals (P)   -SJ     Criteria for Skilled Interventions Met (PT) yes;skilled treatment is necessary (P)   -SJ     Therapy Frequency (PT) daily (P)   -SJ     Predicted Duration of Therapy Intervention (PT) 10 days (P)   -SJ     Problem List (PT) problems related to;balance;cognition;mobility;strength;postural control (P)   -SJ     Activity Limitations Related to  Problem List (PT) unable to ambulate safely;unable to transfer safely (P)   -       Nimesh Morales 07/11/24 1500          PT Evaluation Complexity    History, PT Evaluation Complexity 1-2 personal factors and/or comorbidities (P)   -SJ     Examination of Body Systems (PT Eval Complexity) total of 4 or more elements (P)   -SJ     Clinical Presentation (PT Evaluation Complexity) stable (P)   -SJ     Clinical Decision Making (PT Evaluation Complexity) low complexity (P)   -SJ     Overall Complexity (PT Evaluation Complexity) low complexity (P)   -       [facility] 07/11/24 1500          Therapy Plan Review/Discharge Plan (PT)    Therapy Plan Review (PT) evaluation/treatment results reviewed;patient (P)   -       [facility] 07/11/24 1500          Physical Therapy Goals    Bed Mobility Goal Selection (PT) bed mobility, PT goal 1 (P)   -SJ     Transfer Goal Selection (PT) transfer, PT goal 1 (P)   -SJ     Gait Training Goal Selection (PT) gait training, PT goal 1 (P)   -       Nimesh [facility] 07/11/24 1500          Bed Mobility Goal 1 (PT)    Activity/Assistive Device (Bed Mobility Goal 1, PT) bed mobility activities, all (P)   -SJ     [place] Level/Cues Needed (Bed Mobility Goal 1, PT) modified independence (P)   -SJ     Time Frame (Bed Mobility Goal 1, PT) long term goal (LTG);10 days (P)   -       Nimesh Morales 07/11/24 1500          Transfer Goal 1 (PT)    Activity/Assistive Device (Transfer Goal 1, PT) transfers, all;walker, rolling (P)   -SJ     [place] Level/Cues Needed (Transfer Goal 1, PT) modified independence (P)   -SJ     Time Frame (Transfer Goal 1, PT) long term goal (LTG);10 days (P)   -       Nimesh [facility] 07/11/24 1500          Gait Training Goal 1 (PT)    Activity/Assistive Device (Gait Training Goal 1, PT) gait (walking locomotion);assistive device use;walker, rolling (P)   -SJ     [place] Level (Gait Training Goal 1, PT) modified independence (P)   -SJ     Distance (Gait Training Goal 1, PT) 45 (P)    -               User Key  (r) = Recorded By, (t) = Taken By, (c) = Cosigned By      Initials Name Provider Type     Lauro Nagel, PT Student PT Student                    Physical Therapy Education       Title: PT OT SLP Therapies (In Progress)       Topic: Physical Therapy (Done)       Point: Mobility training (Done)       Learning Progress Summary             Patient Acceptance, E,TB, VU by  at 7/11/2024 1611                         Point: Precautions (Done)       Learning Progress Summary             Patient Acceptance, E,TB, VU by  at 7/11/2024 1611                                         User Key       Initials Effective Dates Name Provider Type Discipline     05/21/24 -  Lauro Nagel, PT Student PT Student PT                  PT Recommendation and Plan  Anticipated Discharge Disposition (PT): (P) sub acute care setting, skilled nursing facility  Planned Therapy Interventions (PT): (P) balance training, bed mobility training, gait training, motor coordination training, postural re-education, stair training, strengthening, transfer training  Therapy Frequency (PT): (P) daily  Plan of Care Reviewed With: (P) patient  Progress: (P) no change  Outcome Evaluation: (P) Pt presents with complaints of being cold during activity. Pt exhibits deficits in balance, activity tolerance, strength, and cognition effecting functional mobility and safety. Skilled therapy required while in hospital to improve safety with transfers and ambulation.   Outcome Measures       Row Name 07/11/24 1600             How much help from another person do you currently need...    Turning from your back to your side while in flat bed without using bedrails? 2 (P)   -SJ      Moving from lying on back to sitting on the side of a flat bed without bedrails? 2 (P)   -SJ      Moving to and from a bed to a chair (including a wheelchair)? 3 (P)   -SJ      Standing up from a chair using your arms (e.g., wheelchair, bedside chair)? 3  (P)   -SJ      Climbing 3-5 steps with a railing? 2 (P)   -SJ      To walk in hospital room? 3 (P)   -      AM-PAC 6 Clicks Score (PT) 15 (P)   -      Highest Level of Mobility Goal 4 --> Transfer to chair/commode (P)   -         Functional Assessment    Outcome Measure Options AM-PAC 6 Clicks Basic Mobility (PT) (P)   -                User Key  (r) = Recorded By, (t) = Taken By, (c) = Cosigned By      Initials Name Provider Type     Lauro Nagel PT Student PT Student                     Time Calculation:    PT Charges       Row Name 07/11/24 1553             Time Calculation    PT Received On 07/11/24 (P)   -      PT Goal Re-Cert Due Date 07/20/24 (P)   -         Untimed Charges    PT Eval/Re-eval Minutes 32 (P)   -         Total Minutes    Untimed Charges Total Minutes 32 (P)   -       Total Minutes 32 (P)   -                User Key  (r) = Recorded By, (t) = Taken By, (c) = Cosigned By      Initials Name Provider Type     Lauro Nagel PT Student PT Student                  Therapy Charges for Today       Code Description Service Date Service Provider Modifiers Qty    [id number] HC PT EVAL LOW COMPLEXITY 3 7/11/2024 Lauro Nagel PT Student GP 1            PT G-Codes  Outcome Measure Options: (P) AM-PAC 6 Clicks Basic Mobility (PT)  AM-PAC 6 Clicks Score (PT): (P) 15  AM-PAC 6 Clicks Score (OT): 16    Lauro Nagel PT Student  7/11/2024

## 2024-07-11 NOTE — THERAPY EVALUATION
Patient Name: Fermín Junior  : 1940    MRN: [id number]                              Today's Date: 2024       Admit Date: 2024    Visit Dx:     ICD-10-CM ICD-9-CM   1. Acute cystitis without hematuria  N30.00 595.0   2. Decreased activities of daily living (ADL)  Z78.9 V49.89     Patient Active Problem List   Diagnosis    UTI (urinary tract infection)     Past Medical History:   Diagnosis Date    Dementia      History reviewed. No pertinent surgical history.   General Information       Row Name 24 1234          OT Time and Intention    Document Type evaluation  -     Mode of Treatment individual therapy;occupational therapy  -       Row Name 24 1234          General Information    Patient Profile Reviewed yes  -LF     Prior Level of Function --  (I) with ADLs and ambulated w/o a device. Pt refused to answer further prior level questions, stating he does not do well with questions.  -     Existing Precautions/Restrictions fall  -     Barriers to Rehab none identified  -       Row Name 24 1234          Occupational Profile    Reason for Services/Referral (Occupational Profile) Patient is an 84 year old male admitted to [facility] for poor p.o. intake and failure to thrive on 2024. Occupational therapy consulted due to recent decline in ADLs/functional transfers. No previous occupational therapy services for current condition.  -       Row Name 24 1234          Living Environment    People in Home other (see comments)  Pt states that he has a caregiver with him at all times  -       Row Name 24 1234          Cognition    Orientation Status (Cognition) oriented to;person  -       Row Name 24 1234          Safety Issues, Functional Mobility    Safety Issues Affecting Function (Mobility) awareness of need for assistance;insight into deficits/self-awareness;judgment  -     Impairments Affecting Function (Mobility)  balance;endurance/activity tolerance;cognition;strength  -               User Key  (r) = Recorded By, (t) = Taken By, (c) = Cosigned By      Initials Name Provider Type     Rosalia Bedoya OT Occupational Therapist                     Mobility/ADL's       Row Name 07/11/24 1238          Bed Mobility    Bed Mobility supine-sit;sit-supine  -     Supine-Sit [place] (Bed Mobility) moderate assist (50% patient effort)  -     Sit-Supine [place] (Bed Mobility) moderate assist (50% patient effort)  -LF       Row Name 07/11/24 1238          Transfers    Transfers sit-stand transfer;stand-sit transfer  -LF       Row Name 07/11/24 1238          Sit-Stand Transfer    Sit-Stand [place] (Transfers) minimum assist (75% patient effort)  -     Assistive Device (Sit-Stand Transfers) walker, front-wheeled  -LF       Row Name 07/11/24 123          Stand-Sit Transfer    Stand-Sit [place] (Transfers) minimum assist (75% patient effort)  -     Assistive Device (Stand-Sit Transfers) walker, front-wheeled  -LF       Row Name 07/11/24 [facility], [id number]          Functional Mobility    Functional Mobility- Ind. Level contact guard assist  -     Functional Mobility- Device walker, front-wheeled  -     Functional Mobility- Comment 2-3 steps left to HOB  -LF       Row Name 07/11/24 1238          Activities of Daily Living    BADL Assessment/Intervention bathing;upper body dressing;lower body dressing;grooming;feeding;toileting  -LF       Row Name 07/11/24 1238          Bathing Assessment/Intervention    [place] Level (Bathing) bathing skills;upper body;standby assist;lower body;maximum assist (25% patient effort)  -LF       Row Name 07/11/24 1238          Upper Body Dressing Assessment/Training    [place] Level (Upper Body Dressing) upper body dressing skills;standby assist  -LF       Row Name 07/11/24 123          Lower Body Dressing Assessment/Training    [place] Level (Lower Body Dressing) lower body  dressing skills;maximum assist (25% patient effort)  -       Row Name 07/11/24 1238          Grooming Assessment/Training    [place] Level (Grooming) grooming skills;standby assist  -LF       Row Name 07/11/24 1238          Self-Feeding Assessment/Training    [place] Level (Feeding) feeding skills;set up  -LF       Row Name 07/11/24 1238          Toileting Assessment/Training    [place] Level (Toileting) toileting skills;maximum assist (25% patient effort)  -               User Key  (r) = Recorded By, (t) = Taken By, (c) = Cosigned By      Initials Name Provider Type     Rosalia Bedoya OT Occupational Therapist                   Obj/Interventions       [facility] Name 07/11/24 1239          Sensory Assessment (Somatosensory)    Sensory Assessment (Somatosensory) UE sensation intact  -LF       Row Name 07/11/24 1239          Vision Assessment/Intervention    Visual Impairment/Limitations WFL  -LF       Row Name 07/11/24 1239          Range of Motion Comprehensive    General Range of Motion bilateral upper extremity ROM WFL  -LF       Row Name 07/11/24 1239          Strength Comprehensive (MMT)    Comment, General Manual Muscle Testing (MMT) Assessment 4-/5 BUEs  -LF       Row Name 07/11/24 1239          Motor Skills    Motor Skills coordination;functional endurance  -LF     Coordination bilateral;upper extremity;WFL  -LF     Functional Endurance Fair-  -LF       Row Name 07/11/24 1239          Balance    Balance Assessment sitting dynamic balance;standing dynamic balance  -     Dynamic Sitting Balance supervision  -LF     Position, Sitting Balance unsupported;sitting edge of bed  -     Dynamic Standing Balance contact guard  -     Position/Device Used, Standing Balance supported;walker, front-wheeled  -               User Key  (r) = Recorded By, (t) = Taken By, (c) = Cosigned By      Initials Name Provider Type     Rosalia Bedoya OT Occupational Therapist                   Goals/Plan        Row Name 07/11/24 1241          Bed Mobility Goal 1 (OT)    Activity/Assistive Device (Bed Mobility Goal 1, OT) bed mobility activities, all  -LF     [place] Level/Cues Needed (Bed Mobility Goal 1, OT) modified independence  -LF     Time Frame (Bed Mobility Goal 1, OT) long term goal (LTG);10 days  -LF       Row Name 07/11/24 1241          Transfer Goal 1 (OT)    Activity/Assistive Device (Transfer Goal 1, OT) transfers, all  -LF     [place] Level/Cues Needed (Transfer Goal 1, OT) modified independence  -LF     Time Frame (Transfer Goal 1, OT) long term goal (LTG);10 days  -LF       Row Name 07/11/24 1241          Bathing Goal 1 (OT)    Activity/Device (Bathing Goal 1, OT) bathing skills, all  -LF     [place] Level/Cues Needed (Bathing Goal 1, OT) modified independence  -LF     Time Frame (Bathing Goal 1, OT) long term goal (LTG);10 days  -LF       Row Name 07/11/24 1241          Dressing Goal 1 (OT)    Activity/Device (Dressing Goal 1, OT) dressing skills, all  -LF     [place]/Cues Needed (Dressing Goal 1, OT) modified independence  -LF     Time Frame (Dressing Goal 1, OT) long term goal (LTG);10 days  -LF       Row Name 07/11/24 1241          Toileting Goal 1 (OT)    Activity/Device (Toileting Goal 1, OT) toileting skills, all  -LF     [place] Level/Cues Needed (Toileting Goal 1, OT) modified independence  -LF     Time Frame (Toileting Goal 1, OT) long term goal (LTG);10 days  -LF       Row Name 07/11/24 1241          Strength Goal 1 (OT)    Strength Goal 1 (OT) Patient will demonstrate 4+/5 BUE strength to support ADLs/functional transfers.  -LF     Time Frame (Strength Goal 1, OT) long term goal (LTG);10 days  -LF       Row Name 07/11/24 1241          Problem Specific Goal 1 (OT)    Problem Specific Goal 1 (OT) Patient will demonstrate fair+ endurance to support ADLs/functional transfers.  -LF     Time Frame (Problem Specific Goal 1, OT) long term goal (LTG);10 days  -LF       Row  Name 07/11/24 1241          Therapy Assessment/Plan (OT)    Planned Therapy Interventions (OT) activity tolerance training;BADL retraining;functional balance retraining;occupation/activity based interventions;patient/caregiver education/training;ROM/therapeutic exercise;strengthening exercise;transfer/mobility retraining  -               User Key  (r) = Recorded By, (t) = Taken By, (c) = Cosigned By      Initials Name Provider Type     Rosalia Bedoya OT Occupational Therapist                   Clinical Impression       Row Name 07/11/24 1240          Pain Assessment    Additional Documentation Pain Scale: FACES Pre/Post-Treatment (Group)  -       Row Name 07/11/24 1240          Pain Scale: FACES Pre/Post-Treatment    Pain: FACES Scale, Pretreatment 2-->hurts little bit  -LF     Posttreatment Pain Rating 2-->hurts little bit  -LF     Pain Location generalized  -       Row Name 07/11/24 1240          Plan of Care Review    Plan of Care Reviewed With patient  -     Progress no change  -     Outcome Evaluation Patient presents with limitations in self-care, functional transfers, balance, and endurance. He would benefit from continued skilled occupational therapy services to maximize independence with ADLs/functional transfers.  -       Row Name 07/11/24 1240          Therapy Assessment/Plan (OT)    Patient/Family Therapy Goal Statement (OT) To maximize independence.  -     Rehab Potential (OT) good, to achieve stated therapy goals  -     Criteria for Skilled Therapeutic Interventions Met (OT) yes;meets criteria;skilled treatment is necessary  -     Therapy Frequency (OT) 5 times/wk  -       Row Name 07/11/24 1240          Therapy Plan Review/Discharge Plan (OT)    Equipment Needs Upon Discharge (OT) walker, rolling;shower chair;commode chair  -     Anticipated Discharge Disposition (OT) skilled nursing facility  -       Row Name 07/11/24 1240          Vital Signs    O2 Delivery Pre Treatment  room air  -LF     O2 Delivery Intra Treatment room air  -LF     O2 Delivery Post Treatment room air  -LF       Row Name 07/11/24 1240          Positioning and Restraints    Pre-Treatment Position in bed  -LF     Post Treatment Position bed  -LF     In Bed fowlers;call light within reach;encouraged to call for assist;exit alarm on  -LF               User Key  (r) = Recorded By, (t) = Taken By, (c) = Cosigned By      Initials Name Provider Type    Rosalia Simmons OT Occupational Therapist                   Outcome Measures       Row Name 07/11/24 1242          How much help from another is currently needed...    Putting on and taking off regular lower body clothing? 2  -LF     Bathing (including washing, rinsing, and drying) 2  -LF     Toileting (which includes using toilet bed pan or urinal) 2  -LF     Putting on and taking off regular upper body clothing 3  -LF     Taking care of personal grooming (such as brushing teeth) 3  -LF     Eating meals 4  -LF     AM-PAC 6 Clicks Score (OT) 16  -LF       Row Name 07/11/24 1242          Functional Assessment    Outcome Measure Options AM-PAC 6 Clicks Daily Activity (OT);Optimal Instrument  -LF       Row Name 07/11/24 1242          Optimal Instrument    Optimal Instrument Optimal - 3  -LF     Bending/Stooping 4  -LF     Standing 2  -LF     Reaching 1  -LF     From the list, choose the 3 activities you would most like to be able to do without any difficulty Bending/stooping;Standing;Reaching  -LF     Total Score Optimal - 3 7  -LF               User Key  (r) = Recorded By, (t) = Taken By, (c) = Cosigned By      Initials Name Provider Type    Rosalia Simmons OT Occupational Therapist                    Occupational Therapy Education       Title: PT OT SLP Therapies (In Progress)       Topic: Occupational Therapy (In Progress)       Point: ADL training (In Progress)       Description:   Instruct learner(s) on proper safety adaptation and remediation techniques during  self care or transfers.   Instruct in proper use of assistive devices.                  Learning Progress Summary             Patient Acceptance, E,TB, NR by  at 7/11/2024 1243                         Point: Precautions (In Progress)       Description:   Instruct learner(s) on prescribed precautions during self-care and functional transfers.                  Learning Progress Summary             Patient Acceptance, E,TB, NR by  at 7/11/2024 1243                         Point: Body mechanics (In Progress)       Description:   Instruct learner(s) on proper positioning and spine alignment during self-care, functional mobility activities and/or exercises.                  Learning Progress Summary             Patient Acceptance, E,TB, NR by  at 7/11/2024 1243                                         User Key       Initials Effective Dates Name Provider Type Discipline     06/16/21 -  Rosalia Bedoya OT Occupational Therapist OT                  OT Recommendation and Plan  Planned Therapy Interventions (OT): activity tolerance training, BADL retraining, functional balance retraining, occupation/activity based interventions, patient/caregiver education/training, ROM/therapeutic exercise, strengthening exercise, transfer/mobility retraining  Therapy Frequency (OT): 5 times/wk  Plan of Care Review  Plan of Care Reviewed With: patient  Progress: no change  Outcome Evaluation: Patient presents with limitations in self-care, functional transfers, balance, and endurance. He would benefit from continued skilled occupational therapy services to maximize independence with ADLs/functional transfers.     Time Calculation:   Evaluation Complexity (OT)  Review Occupational Profile/Medical/Therapy History Complexity: brief/low complexity  Assessment, Occupational Performance/Identification of Deficit Complexity: 3-5 performance deficits  Clinical Decision Making Complexity (OT): problem focused assessment/low  complexity  Overall Complexity of Evaluation (OT): low complexity     Time Calculation- OT       Row Name 07/11/24 1243             Time Calculation- OT    OT Received On 07/11/24  -LF      OT Goal Re-Cert Due Date 07/20/24  -LF         Untimed Charges    OT Eval/Re-eval Minutes 35  -LF         Total Minutes    Untimed Charges Total Minutes 35  -LF       Total Minutes 35  -LF                User Key  (r) = Recorded By, (t) = Taken By, (c) = Cosigned By      Initials Name Provider Type    LF Rosalia Bedoya OT Occupational Therapist                  Therapy Charges for Today       Code Description Service Date Service Provider Modifiers Qty    89698975460 HC OT EVAL LOW COMPLEXITY 3 7/11/2024 Rosalia Bedoya OT GO 1                 Rosalia Bedoya OT  7/11/2024

## 2024-07-11 NOTE — PROGRESS NOTES
"DAILY PROGRESS NOTE  [facility]ITALIST [facility]      PATIENT IDENTIFICATION    Name: Fermín Junior  :  1940  MRN: [id number]    CHIEF COMPLAINT/PRINCIPAL DIAGNOSIS: UTI (urinary tract infection)       SUBJECTIVE    Refusing all treatments including pills and vitals. No fevers.     ROS:   Gen: No fever or chills  CV: no chest pain or palpitation  Resp: no shortness of breath or cough  GI: no nausea, vomiting, diarrhea  Neuro: no headache or dizziness     OBJECTIVE     Exam:  BP 93/55 (BP Location: Left arm, Patient Position: Sitting)   Pulse 82   Temp 97.9 °F (36.6 °C) (Oral)   Resp 16   Ht 177.8 cm (70\")   Wt 57 kg (125 lb 10.6 oz)   SpO2 98%   BMI 18.03 kg/m²   Intake/Output last 24 hours:    Intake/Output Summary (Last 24 hours) at 2024 1121  Last data filed at 2024 0836  Gross per 24 hour   Intake 880 ml   Output 400 ml   Net 480 ml        Gen: NAD, up in bed  Resp: CTAB, no increased work of breathing  CV: RRR, no m/r/g. No peripheral edema  GI: Soft, nontender, (+) BS. nondistended  Psych: Alert and Oriented x 1, Mood and affect appropriate to situation  Skin: warm and dry on palpation. No rash on inspection.  Neuro: moves all 4 extremities, follows simple commands    DATA REVIEW:  Lab Results (last 24 hours)       Procedure Component Value Units Date/Time    Vitamin B12 [[id number]]  (Normal) Collected: 24 1825    Specimen: Blood from Arm, Right Updated: 07/10/24 1635     Vitamin B-12 717 pg/mL     Narrative:      Results may be falsely increased if patient taking Biotin.      Urine Culture - Urine, Urine, Clean Catch [[id number]]  (Normal) Collected: 24 2133    Specimen: Urine, Clean Catch Updated: 07/10/24 122     Urine Culture Growth present, too young to evaluate            Imaging Results (Last 24 Hours)       Procedure Component Value Units Date/Time    CT Head Without Contrast [[id number]] Collected: 07/10/24 2110     Updated: 07/10/24 2114    Narrative:      CT HEAD WO " CONTRAST    Date of Exam: 7/10/2024 7:39 PM EDT    Indication: worsening mental status.    Comparison: None available.    Technique: Axial CT images were obtained of the head without contrast administration.  Reconstructed coronal and sagittal images were also obtained. Automated exposure control and iterative construction methods were used.      Findings:  There is no evidence of hemorrhage. There is no mass effect or midline shift. There is diffuse brain atrophy. Periventricular hypodense areas compatible with chronic small ischemia    There is no extracerebral collection.    Ventricles are normal in size and configuration for patient's stated age.     Posterior fossa is within normal limits.    Calvarium and skull base appear intact.  Visualized sinuses show no air fluid levels. Visualized orbits are unremarkable.        Impression:      Impression:  No acute intracranial abnormality        Electronically Signed: Tomasz Harris MD    7/10/2024 9:12 PM EDT    Workstation ID: [id number]            Labs and imaging noted above have been personally reviewed by me.    Scheduled Meds:cefTRIAXone, 1,000 mg, Intravenous, Q24H  ferrous sulfate, 325 mg, Oral, Daily With Breakfast  gabapentin, 100 mg, Oral, Daily  heparin (porcine), 5,000 Units, Subcutaneous, Q8H  lactated ringers, 500 mL, Intravenous, Once  metoprolol succinate XL, 25 mg, Oral, Daily  pantoprazole, 20 mg, Oral, Q AM  QUEtiapine, 75 mg, Oral, Nightly  sodium chloride, 10 mL, Intravenous, Q12H      Continuous Infusions:   PRN Meds:  senna-docusate sodium **AND** polyethylene glycol **AND** bisacodyl **AND** bisacodyl    dextrose    dextrose    glucagon (human recombinant)    sodium chloride    sodium chloride    sodium chloride    ASSESSMENT/PLAN      UTI (urinary tract infection)    FTT  Encephalopathy on top of Dementia vs progressive dementia  - unclear if his symptoms are related to infection from UTI vs progression of underlying dementia  - home seroquel  resumed  - u/a infectious -- see below  - CXR clear  - B12 and TSH normal  - CT Head without acute abnormality  - PT/OT eval  - Palliative following for GOC -- he is refusing all treatment at this point including pills    HTN  - home toprol resumed    UTI  - u/a infectious, UCx pending  - not receiving CTX as he is refusing all forms of treatment    COPD  - continue home symbicort      Nutrition - Diet: Regular/House; Fluid Consistency: Thin (IDDSI 0)   DVT Prophylaxis - sub-q heparin  Code Status - dnr/dni   GI ppx - ppi  Disposition - needs LTC placement ideally with hospice care, medically ready        Rodger Yo MD  [facility]  7/11/2024  11:21 EDT

## 2024-07-11 NOTE — CONSULTS
07/11/24 1040   Coping/Psychosocial   Additional Documentation Spiritual Care (Group)   Spiritual Care   Spiritual Care Source  initiative   Response to Spiritual Care visit timing not optimal (follow-up planned)  (pt asleep)   Spiritual Care Visit Type initial   Pt is currently on 5STU   Prescription approved per [facility] Refill Protocol.  Loan Mejia RN

## 2024-07-12 LAB — BACTERIA SPEC AEROBE CULT: ABNORMAL

## 2024-07-12 PROCEDURE — 99232 SBSQ HOSP IP/OBS MODERATE 35: CPT | Performed by: INTERNAL MEDICINE

## 2024-07-12 PROCEDURE — 97535 SELF CARE MNGMENT TRAINING: CPT

## 2024-07-12 PROCEDURE — 97530 THERAPEUTIC ACTIVITIES: CPT

## 2024-07-12 NOTE — PROGRESS NOTES
"DAILY PROGRESS NOTE  HOSPITALIST GROUP      PATIENT IDENTIFICATION    Name: Fermín Junior  :  1940  MRN: [id number]    CHIEF COMPLAINT/PRINCIPAL DIAGNOSIS: UTI (urinary tract infection)       SUBJECTIVE    No acute events overnight, patient still refusing treatments     OBJECTIVE     Exam:  /52 (BP Location: Left arm, Patient Position: Lying)   Pulse 84   Temp 98.2 °F (36.8 °C) (Oral)   Resp 16   Ht 177.8 cm (70\")   Wt 57 kg (125 lb 10.6 oz)   SpO2 99%   BMI 18.03 kg/m²   Intake/Output last 24 hours:    Intake/Output Summary (Last 24 hours) at 2024 1343  Last data filed at 2024 1308  Gross per 24 hour   Intake 360 ml   Output 1000 ml   Net -640 ml        GEN: No acute distress  HEENT: Moist mucous membranes  LUNGS: Equal chest rise bilaterally  CARDIAC: Regular rate and rhythm  NEURO: Moving all 4 extremities spontaneously  SKIN: No obvious breakdown    DATA REVIEW:  Lab Results (last 24 hours)       Procedure Component Value Units Date/Time    Urine Culture - Urine, Urine, Clean Catch [[id number]]  (Abnormal)  (Susceptibility) Collected: 24 2133    Specimen: Urine, Clean Catch Updated: 24 1040     Urine Culture >100,000 CFU/mL Escherichia coli    Narrative:      Colonization of the urinary tract without infection is common. Treatment is discouraged unless the patient is symptomatic, pregnant, or undergoing an invasive urologic procedure.    Susceptibility        Escherichia coli      SAYRA      Amikacin Susceptible      Amoxicillin + Clavulanate Resistant      Ampicillin Resistant      Ampicillin + Sulbactam Resistant      Cefazolin Susceptible      Cefepime Susceptible      Ceftazidime Susceptible      Ceftriaxone Susceptible      Gentamicin Resistant      Levofloxacin Susceptible      Nitrofurantoin Susceptible      Piperacillin + Tazobactam Resistant      Tobramycin Intermediate      Trimethoprim + Sulfamethoxazole Resistant                                   Imaging Results " (Last 24 Hours)       ** No results found for the last 24 hours. **            Labs and imaging noted above have been personally reviewed by me.    Scheduled Meds:cefTRIAXone, 1,000 mg, Intravenous, Q24H  ferrous sulfate, 325 mg, Oral, Daily With Breakfast  gabapentin, 100 mg, Oral, Daily  heparin (porcine), 5,000 Units, Subcutaneous, Q8H  lactated ringers, 500 mL, Intravenous, Once  metoprolol succinate XL, 25 mg, Oral, Daily  pantoprazole, 20 mg, Oral, Q AM  QUEtiapine, 75 mg, Oral, Nightly  sodium chloride, 10 mL, Intravenous, Q12H      Continuous Infusions:   PRN Meds:  senna-docusate sodium **AND** polyethylene glycol **AND** bisacodyl **AND** bisacodyl    dextrose    dextrose    glucagon (human recombinant)    sodium chloride    sodium chloride    sodium chloride    ASSESSMENT/PLAN      UTI (urinary tract infection)    FTT  Encephalopathy on top of Dementia vs progressive dementia  - unclear if his symptoms are related to infection from UTI vs progression of underlying dementia  - home seroquel resumed  - u/a infectious -- see below  - CXR clear  - B12 and TSH normal  - CT Head without acute abnormality  - PT/OT eval  - Palliative following for GOC -- he is refusing all treatment at this point including pills    HTN  - home toprol resumed    UTI  - u/a infectious, UCx pending  - not receiving CTX as he continues to refuse treatment COPD  - continue home symbicort      Nutrition - Diet: Regular/House; Fluid Consistency: Thin (IDDSI 0)   DVT Prophylaxis - sub-q heparin  Code Status - dnr/dni   GI ppx - ppi  Disposition - needs LTC placement ideally with hospice care, medically ready          Electronically signed by Gerson Rosenberg MD, 7/12/2024, 13:43 EDT.

## 2024-07-12 NOTE — PLAN OF CARE
Goal Outcome Evaluation:  Plan of Care Reviewed With: patient        Progress: no change  Outcome Evaluation: Pt continues to refuses meds. Tolerates frequent turns and assistance with ADLs. Placement pending.

## 2024-07-12 NOTE — PLAN OF CARE
Goal Outcome Evaluation:           Progress: no change  Outcome Evaluation: VSS, A&O x2, patient continues to refuse medication, IV access.

## 2024-07-12 NOTE — THERAPY TREATMENT NOTE
Patient Name: Fermín Junior  : 1940    MRN: [id number]                              Today's Date: 2024       Admit Date: 2024    Visit Dx:     ICD-10-CM ICD-9-CM   1. Acute cystitis without hematuria  N30.00 595.0   2. Decreased activities of daily living (ADL)  Z78.9 V49.89   3. Difficulty in walking  R26.2 719.7     Patient Active Problem List   Diagnosis    UTI (urinary tract infection)     Past Medical History:   Diagnosis Date    Dementia      History reviewed. No pertinent surgical history.   General Information       Row Name 24 1318          OT Time and Intention    Document Type therapy note (daily note)  -     Mode of Treatment individual therapy;occupational therapy  -               User Key  (r) = Recorded By, (t) = Taken By, (c) = Cosigned By      Initials Name Provider Type     Rosalia Bedoya OT Occupational Therapist                     Mobility/ADL's       Row Name 24 1321          Bed Mobility    Bed Mobility supine-sit;sit-supine  -     Supine-Sit [place] (Bed Mobility) minimum assist (75% patient effort)  -     Sit-Supine [place] (Bed Mobility) moderate assist (50% patient effort)  -     Bed Mobility, Safety Issues decreased use of arms for pushing/pulling;decreased use of legs for bridging/pushing  -     Assistive Device (Bed Mobility) bed rails;head of bed elevated  -       Row Name 24 1321          Transfers    Transfers sit-stand transfer;stand-sit transfer;toilet transfer  -       Row Name 24 1321          Sit-Stand Transfer    Sit-Stand [place] (Transfers) minimum assist (75% patient effort);1 person assist  -     Assistive Device (Sit-Stand Transfers) walker, front-wheeled  -       Row Name 24 1321          Stand-Sit Transfer    Stand-Sit [place] (Transfers) minimum assist (75% patient effort);1 person assist  -     Assistive Device (Stand-Sit Transfers) walker, front-wheeled  -       Row Name  07/12/24 1321          Toilet Transfer    Type (Toilet Transfer) sit-stand;stand-sit  -LF     [place] Level (Toilet Transfer) minimum assist (75% patient effort)  -LF     Assistive Device (Toilet Transfer) commode, 3-in-1;walker, front-wheeled  -LF       Row Name 07/12/24 132          Functional Mobility    Functional Mobility- Ind. Level contact guard assist;verbal cues required  -LF     Functional Mobility- Device walker, front-wheeled  -LF     Functional Mobility- Comment Pt completed ~5ft of functional mobility from [facility] to [facility] after toileting with CGA using RW.  -       Row Name 07/12/24 132          Activities of Daily Living    BADL Assessment/Intervention toileting  -       Row Name 07/12/24 132          Toileting Assessment/Training    [place] Level (Toileting) toileting skills;adjust/manage clothing;perform perineal hygiene;dependent (less than 25% patient effort)  -     Assistive Devices (Toileting) commode, 3-in-1  -LF     Position (Toileting) unsupported sitting;supported standing  -     Comment, (Toileting) Incontinent of bowels, dependent for alcides-care.  -               User Key  (r) = Recorded By, (t) = Taken By, (c) = Cosigned By      Initials Name Provider Type     Rosalia Bedoya OT Occupational Therapist                   Obj/Interventions       Row Name 07/12/24 1327          Motor Skills    Functional Endurance Fair-  -       Row Name 07/12/24 132          Balance    Balance Assessment sitting dynamic balance;standing dynamic balance  -     Dynamic Sitting Balance supervision  -     Position, Sitting Balance unsupported;sitting edge of bed  -     Dynamic Standing Balance minimal assist  -LF     Position/Device Used, Standing Balance supported;walker, front-wheeled  -LF     Balance Interventions sitting;standing;sit to stand;dynamic;occupation based/functional task;UE activity with balance activity  -     Comment, Balance BUE reaching activity completed on  EOB with max encouragement throughout. Right lateral and posterior lean noted, requiring cues for trunk extension/core activation to achieve upright posture.  -LF               User Key  (r) = Recorded By, (t) = Taken By, (c) = Cosigned By      Initials Name Provider Type    Rosalia Simmons OT Occupational Therapist                   Goals/Plan    No documentation.                  Clinical Impression       Row Name 07/12/24 1331          Pain Assessment    Additional Documentation Pain Scale: FACES Pre/Post-Treatment (Group)  -LF       Row Name 07/12/24 1331          Pain Scale: FACES Pre/Post-Treatment    Pain: FACES Scale, Pretreatment 2-->hurts little bit  -LF     Posttreatment Pain Rating 2-->hurts little bit  -LF     Pain Location generalized  -LF       Row Name 07/12/24 1331          Plan of Care Review    Plan of Care Reviewed With patient  -LF     Progress improving  -LF     Outcome Evaluation Pt agreeable to balance, functional transfer/mobility, and ADL training with max encouragement as noted above. He would benefit from continued OT services to maximize independence.  -       Row Name 07/12/24 1331          Vital Signs    O2 Delivery Pre Treatment room air  -LF     O2 Delivery Intra Treatment room air  -LF     O2 Delivery Post Treatment room air  -       Row Name 07/12/24 1331          Positioning and Restraints    Pre-Treatment Position in bed  -LF     Post Treatment Position bed  -LF     In Bed fowlers;call light within reach;encouraged to call for assist;exit alarm on  -LF               User Key  (r) = Recorded By, (t) = Taken By, (c) = Cosigned By      Initials Name Provider Type    Rosalia Simmons OT Occupational Therapist                   Outcome Measures       Row Name 07/12/24 1333          How much help from another is currently needed...    Putting on and taking off regular lower body clothing? 2  -LF     Bathing (including washing, rinsing, and drying) 2  -LF     Toileting  (which includes using toilet bed pan or urinal) 2  -LF     Putting on and taking off regular upper body clothing 3  -LF     Taking care of personal grooming (such as brushing teeth) 3  -LF     Eating meals 4  -LF     AM-PAC 6 Clicks Score (OT) 16  -LF       Row Name 07/12/24 0730          How much help from another person do you currently need...    Turning from your back to your side while in flat bed without using bedrails? 3  -TL     Moving from lying on back to sitting on the side of a flat bed without bedrails? 3  -TL     Moving to and from a bed to a chair (including a wheelchair)? 2  -TL     Standing up from a chair using your arms (e.g., wheelchair, bedside chair)? 2  -TL     Climbing 3-5 steps with a railing? 2  -TL     To walk in hospital room? 2  -TL     AM-PAC 6 Clicks Score (PT) 14  -TL     Highest Level of Mobility Goal 4 --> Transfer to chair/commode  -TL       Row Name 07/12/24 1333          Functional Assessment    Outcome Measure Options AM-PAC 6 Clicks Daily Activity (OT);Optimal Instrument  -LF       Row Name 07/12/24 1333          Optimal Instrument    Optimal Instrument Optimal - 3  -LF     Bending/Stooping 4  -LF     Standing 2  -LF     Reaching 1  -LF     From the list, choose the 3 activities you would most like to be able to do without any difficulty Bending/stooping;Standing;Reaching  -LF     Total Score Optimal - 3 7  -LF               User Key  (r) = Recorded By, (t) = Taken By, (c) = Cosigned By      Initials Name Provider Type    LF Rosalia Bedoya OT Occupational Therapist    Debra Jones, RN Registered Nurse                    Occupational Therapy Education       Title: PT OT SLP Therapies (In Progress)       Topic: Occupational Therapy (In Progress)       Point: ADL training (In Progress)       Description:   Instruct learner(s) on proper safety adaptation and remediation techniques during self care or transfers.   Instruct in proper use of assistive devices.                   Learning Progress Summary             Patient Acceptance, E,TB, NR by  at 7/11/2024 1243                         Point: Precautions (In Progress)       Description:   Instruct learner(s) on prescribed precautions during self-care and functional transfers.                  Learning Progress Summary             Patient Acceptance, E,TB, NR by  at 7/11/2024 1243                         Point: Body mechanics (In Progress)       Description:   Instruct learner(s) on proper positioning and spine alignment during self-care, functional mobility activities and/or exercises.                  Learning Progress Summary             Patient Acceptance, E,TB, NR by  at 7/11/2024 1243                                         User Key       Initials Effective Dates Name Provider Type Discipline     06/16/21 -  Rosalia Bedoya OT Occupational Therapist OT                  OT Recommendation and Plan  Planned Therapy Interventions (OT): activity tolerance training, BADL retraining, functional balance retraining, occupation/activity based interventions, patient/caregiver education/training, ROM/therapeutic exercise, strengthening exercise, transfer/mobility retraining  Therapy Frequency (OT): 5 times/wk  Plan of Care Review  Plan of Care Reviewed With: patient  Progress: improving  Outcome Evaluation: Pt agreeable to balance, functional transfer/mobility, and ADL training with max encouragement as noted above. He would benefit from continued OT services to maximize independence.     Time Calculation:   Evaluation Complexity (OT)  Review Occupational Profile/Medical/Therapy History Complexity: brief/low complexity  Assessment, Occupational Performance/Identification of Deficit Complexity: 3-5 performance deficits  Clinical Decision Making Complexity (OT): problem focused assessment/low complexity  Overall Complexity of Evaluation (OT): low complexity     Time Calculation- OT       Row Name 07/12/24 1333             Time  Calculation- OT    OT Received On 07/12/24  -LF      OT Goal Re-Cert Due Date 07/20/24  -LF         Timed Charges    19004 - OT Therapeutic Activity Minutes 15  -LF      25657 - OT Self Care/Mgmt Minutes 10  -LF         Total Minutes    Timed Charges Total Minutes 25  -LF       Total Minutes 25  -LF                User Key  (r) = Recorded By, (t) = Taken By, (c) = Cosigned By      Initials Name Provider Type     Rosalia Bedoya OT Occupational Therapist                  Therapy Charges for Today       Code Description Service Date Service Provider Modifiers Qty    91269857730 HC OT EVAL LOW COMPLEXITY 3 7/11/2024 Rosalia Bedoya OT GO 1    58470568948 HC OT THERAPEUTIC ACT EA 15 MIN 7/12/2024 Rosalia Bedoya OT GO 1    57208024660 HC OT SELF CARE/MGMT/TRAIN EA 15 MIN 7/12/2024 Rosalia Bedoya OT GO 1                 Rosalia Bedoya OT  7/12/2024

## 2024-07-13 PROCEDURE — 99233 SBSQ HOSP IP/OBS HIGH 50: CPT | Performed by: HOSPITALIST

## 2024-07-13 NOTE — PROGRESS NOTES
"DAILY PROGRESS NOTE  HOSPITALIST GROUP      PATIENT IDENTIFICATION    Name: Fermín Junior  :  1940  MRN: [id number]    CHIEF COMPLAINT/PRINCIPAL DIAGNOSIS: UTI (urinary tract infection)       SUBJECTIVE    No acute issues.  Patient refusing everything.  Patient does not want to discuss what is bothering him.     OBJECTIVE     Exam:  /63 (BP Location: Left arm, Patient Position: Lying)   Pulse 78   Temp 97.7 °F (36.5 °C) (Oral)   Resp 16   Ht 177.8 cm (70\")   Wt 57 kg (125 lb 10.6 oz)   SpO2 100%   BMI 18.03 kg/m²   Intake/Output last 24 hours:    Intake/Output Summary (Last 24 hours) at 2024 0981  Last data filed at 2024 0523  Gross per 24 hour   Intake 200 ml   Output 700 ml   Net -500 ml        GEN: No acute distress  HEENT: Moist mucous membranes  LUNGS: Equal chest rise bilaterally  CARDIAC: Regular rate and rhythm  NEURO: Moving all 4 extremities spontaneously  SKIN: No obvious breakdown    DATA REVIEW:  Lab Results (last 24 hours)       Procedure Component Value Units Date/Time    Urine Culture - Urine, Urine, Clean Catch [[id number]]  (Abnormal)  (Susceptibility) Collected: 24 2133    Specimen: Urine, Clean Catch Updated: 24 1040     Urine Culture >100,000 CFU/mL Escherichia coli    Narrative:      Colonization of the urinary tract without infection is common. Treatment is discouraged unless the patient is symptomatic, pregnant, or undergoing an invasive urologic procedure.    Susceptibility        Escherichia coli      SAYRA      Amikacin Susceptible      Amoxicillin + Clavulanate Resistant      Ampicillin Resistant      Ampicillin + Sulbactam Resistant      Cefazolin Susceptible      Cefepime Susceptible      Ceftazidime Susceptible      Ceftriaxone Susceptible      Gentamicin Resistant      Levofloxacin Susceptible      Nitrofurantoin Susceptible      Piperacillin + Tazobactam Resistant      Tobramycin Intermediate      Trimethoprim + Sulfamethoxazole Resistant          "                          Imaging Results (Last 24 Hours)       ** No results found for the last 24 hours. **            Labs and imaging noted above have been personally reviewed by me.    Scheduled Meds:cefTRIAXone, 1,000 mg, Intravenous, Q24H  ferrous sulfate, 325 mg, Oral, Daily With Breakfast  gabapentin, 100 mg, Oral, Daily  heparin (porcine), 5,000 Units, Subcutaneous, Q8H  lactated ringers, 500 mL, Intravenous, Once  metoprolol succinate XL, 25 mg, Oral, Daily  pantoprazole, 20 mg, Oral, Q AM  QUEtiapine, 75 mg, Oral, Nightly  sodium chloride, 10 mL, Intravenous, Q12H      Continuous Infusions:   PRN Meds:  senna-docusate sodium **AND** polyethylene glycol **AND** bisacodyl **AND** bisacodyl    dextrose    dextrose    glucagon (human recombinant)    sodium chloride    sodium chloride    sodium chloride    ASSESSMENT/PLAN      UTI (urinary tract infection)    FTT  Encephalopathy on top of Dementia vs progressive dementia  - unclear if his symptoms are related to infection from UTI vs progression of underlying dementia  - home seroquel resumed  - u/a infectious -- see below  - CXR clear  - B12 and TSH normal  - CT Head without acute abnormality  - PT/OT eval  - Palliative following for GOC -- he is refusing all treatment at this point including pills    HTN  - home toprol resumed    UTI  - u/a infectious, UCx pending  - not receiving CTX as he continues to refuse treatment COPD  - continue home symbicort      Nutrition - Diet: Regular/House; Fluid Consistency: Thin (IDDSI 0)   DVT Prophylaxis - sub-q heparin  Code Status - dnr/dni   GI ppx - ppi  Disposition - needs LTC placement ideally with hospice care, medically ready     Will transfer him off of telemetry floor.  So patient can be as comfortable as possible.       Electronically signed by Rosaura Joe DO, 07/13/24, 9:30 AM EDT.

## 2024-07-13 NOTE — PLAN OF CARE
Goal Outcome Evaluation:  Plan of Care Reviewed With: patient        Progress: no change  Outcome Evaluation: vss aox2 on room air. no IV access, MD aware. Wound care consult placed. skin care provided. q2 turned. pt resting in bed comfortably with no complaints at this time.

## 2024-07-13 NOTE — PLAN OF CARE
Goal Outcome Evaluation:      No changes overnight. Patient refused all medications and labs but is cooperative with turns.

## 2024-07-13 NOTE — NURSING NOTE
Pt arrived to 4NT from 5th floor, second set of eyes completed with Rachel Cotter RN BSN. Red excoriated groin and penis, red nonblanchable areas on coccyx, and red blanchable heels noted. Wound consult placed. Blue top applied to bony prominences, butt, and heels. Powder applied to penis and groin. Wound photos taken.

## 2024-07-14 PROCEDURE — 99232 SBSQ HOSP IP/OBS MODERATE 35: CPT | Performed by: INTERNAL MEDICINE

## 2024-07-14 NOTE — PLAN OF CARE
Goal Outcome Evaluation:              Outcome Evaluation: aox2, pleasant patient. continues to refuse medications, labs, and an IV. attending MD aware. skin care provided. frequently turned and repositioned. fall risk measures in place. continues with poor PO intake, encouraging snacks and drinks throughout the shift.

## 2024-07-14 NOTE — PROGRESS NOTES
"DAILY PROGRESS NOTE  HOSPITALIST GROUP      PATIENT IDENTIFICATION    Name: Fermín Junior  :  1940  MRN: [id number]    CHIEF COMPLAINT/PRINCIPAL DIAGNOSIS: UTI (urinary tract infection)       SUBJECTIVE    No acute events overnight, patient continues to refuse treatments     OBJECTIVE     Exam:  /72 (BP Location: Left arm, Patient Position: Lying)   Pulse 81   Temp 97.3 °F (36.3 °C) (Oral)   Resp 18   Ht 177.8 cm (70\")   Wt 57 kg (125 lb 10.6 oz)   SpO2 98%   BMI 18.03 kg/m²   Intake/Output last 24 hours:    Intake/Output Summary (Last 24 hours) at 2024 1144  Last data filed at 2024 0900  Gross per 24 hour   Intake 290 ml   Output --   Net 290 ml        GEN: No acute distress  HEENT: Moist mucous membranes  LUNGS: Equal chest rise bilaterally  CARDIAC: Regular rate and rhythm  NEURO: Moving all 4 extremities spontaneously  SKIN: No obvious breakdown    DATA REVIEW:  Lab Results (last 24 hours)       ** No results found for the last 24 hours. **            Imaging Results (Last 24 Hours)       ** No results found for the last 24 hours. **            Labs and imaging noted above have been personally reviewed by me.    Scheduled Meds:cefTRIAXone, 1,000 mg, Intravenous, Q24H  ferrous sulfate, 325 mg, Oral, Daily With Breakfast  gabapentin, 100 mg, Oral, Daily  heparin (porcine), 5,000 Units, Subcutaneous, Q8H  lactated ringers, 500 mL, Intravenous, Once  metoprolol succinate XL, 25 mg, Oral, Daily  pantoprazole, 20 mg, Oral, Q AM  QUEtiapine, 75 mg, Oral, Nightly  sodium chloride, 10 mL, Intravenous, Q12H      Continuous Infusions:   PRN Meds:  senna-docusate sodium **AND** polyethylene glycol **AND** bisacodyl **AND** bisacodyl    dextrose    dextrose    glucagon (human recombinant)    sodium chloride    sodium chloride    sodium chloride    ASSESSMENT/PLAN      UTI (urinary tract infection)    FTT  Encephalopathy on top of Dementia vs progressive dementia  - unclear if his symptoms are " related to infection from UTI vs progression of underlying dementia  - home seroquel resumed  - u/a infectious -- see below  - CXR clear  - B12 and TSH normal  - CT Head without acute abnormality  - PT/OT eval  - Palliative following for GOC -- he is refusing all treatment at this point including pills    HTN  - home toprol resumed    UTI  - u/a infectious, UCx pending  - not receiving CTX as he continues to refuse treatment COPD  - continue home symbicort      Nutrition - Diet: Regular/House; Fluid Consistency: Thin (IDDSI 0)   DVT Prophylaxis - sub-q heparin  Code Status - dnr/dni   GI ppx - ppi  Disposition - needs LTC placement ideally with hospice care, medically ready       Electronically signed by Gerson Rosenberg MD, 7/14/2024, 11:45 EDT.

## 2024-07-14 NOTE — PLAN OF CARE
Goal Outcome Evaluation:  Plan of Care Reviewed With: patient   Outcome Evaluation: vss aox2 on room air. no IV access, MD aware. skin care provided. q2 turned.  Patient refused all medication during the night. pt resting in bed comfortably with no complaints at this time.

## 2024-07-15 PROCEDURE — 99232 SBSQ HOSP IP/OBS MODERATE 35: CPT | Performed by: INTERNAL MEDICINE

## 2024-07-15 NOTE — PLAN OF CARE
Goal Outcome Evaluation:   Outcome Evaluation: aox2, pleasant patient. continues to refuse medications, . attending MD aware. skin care provided. frequently turned and repositioned. fall risk measures in place. continues with poor PO intake, encouraging drinks during shift

## 2024-07-15 NOTE — PROGRESS NOTES
[facility]   Hospitalist Progress Note  Date: 7/15/2024  Patient Name: Fermín Junior  : 1940  MRN: [id number]  Date of admission: 2024    Subjective   Subjective     Chief Complaint:   UTI    Summary:   Fermín Junior is a 84 y.o. male with a past medical history of dementia, hypertension, anxiety presented to the ED for evaluation of decreased appetite, failure to thrive, not taking his medications.  As per the reports, patient is currently staying at home, unable to perform ADLs and is requiring 2 people assistance.  As per the family, patient is needing 24-hour care and they are unable to provide at home at this time.  POA is patient's grandson and he is now taking care of the patient.  Unable to obtain any history from the patient due to dementia.  He is alert and oriented to self.  Case management has been consulted while the patient was in the ED and had discussion with the family.  Patient was found to have urinary tract infection, patient declined treatment, he refused multiple medications, he refused lab checks.  Clinically he is stable however with no obvious signs or symptoms of worsening infection.  Patient will need placement following hospitalization    Interval Followup:   No acute events, patient resting comfortably    Objective   Objective     Vitals:   Temp:  [97.3 °F (36.3 °C)-98.1 °F (36.7 °C)] 97.3 °F (36.3 °C)  Heart Rate:  [67-81] 78  Resp:  [16-18] 18  BP: ()/(51-64) 95/64    Physical Exam   GEN: No acute distress  HEENT: Moist mucous membranes  LUNGS: Equal chest rise bilaterally  CARDIAC: Regular rate and rhythm  NEURO: Moving all 4 extremities spontaneously  SKIN: No obvious breakdown    Result Review    Result Review:  I have personally reviewed the results as below  [x]  Laboratory CBC, CMP personally reviewed  CBC          4/3/2024    15:13 2024    16:21 2024    18:25   CBC   WBC 6.7     5.6     6.35    RBC 3.6     3.5     3.49    Hemoglobin 11.4     11.6     11.1     Hematocrit 35.1     34.1     34.6    MCV 96.4     98.0     99.1    MCH 31.3     33.3     31.8    MCHC 32.5     33.9     32.1    RDW 16.1     15.3     14.1    Platelets 204     168     180       Details          This result is from an external source.             CMP          1/9/2024    19:37 7/9/2024    18:25   CMP   Glucose 137  104    BUN 27  27    Creatinine 1.25  0.87    EGFR 57.5  85.6    Sodium 135  141    Potassium 4.6  4.4    Chloride 100  105    Calcium 8.9  8.8    Total Protein  6.5    Albumin  3.4    Globulin  3.1    Total Bilirubin  <0.2    Alkaline Phosphatase  95    AST (SGOT)  13    ALT (SGPT)  7    Albumin/Globulin Ratio  1.1    BUN/Creatinine Ratio 21.6  31.0    Anion Gap 11.0  8.3      []  Microbiology  []  Radiology  []  EKG/Telemetry   []  Cardiology/Vascular   []  Pathology  []  Old records  []  Other:    Scheduled medications:  ferrous sulfate, 325 mg, Oral, Daily With Breakfast  gabapentin, 100 mg, Oral, Daily  heparin (porcine), 5,000 Units, Subcutaneous, Q8H  lactated ringers, 500 mL, Intravenous, Once  metoprolol succinate XL, 25 mg, Oral, Daily  pantoprazole, 20 mg, Oral, Q AM  QUEtiapine, 75 mg, Oral, Nightly  sodium chloride, 10 mL, Intravenous, Q12H      As needed medications:  •  senna-docusate sodium **AND** polyethylene glycol **AND** bisacodyl **AND** bisacodyl  •  dextrose  •  dextrose  •  glucagon (human recombinant)  •  sodium chloride  •  sodium chloride  •  sodium chloride  Infusions:          Assessment & Plan   Assessment / Plan     Assessment:  Failure to thrive  Hypertension  Urinary tract infection  Dementia    Plan:  Patient admitted to the hospital for further workup and management of above  Patient refusing antibiotics  Urine culture significant for ESBL E. Coli  No worsening fever or mentation  Refusing lab checks  Resume home medication as patient's allow  PT/OT  Social work arranging rehab  Clinical course will dictate further management     Reviewed patients labs  and imaging, and discussed with patient and nurse at bedside.    VTE Prophylaxis:  Pharmacologic VTE prophylaxis orders are present.        CODE STATUS:   Medical Intervention Limits: No intubation (DNI)  Level Of Support Discussed With: Health Care Surrogate  Code Status (Patient has no pulse and is not breathing): No CPR (Do Not Attempt to Resuscitate)  Medical Interventions (Patient has pulse or is breathing): Limited Support      Electronically signed by Gerson Rosenberg MD, 7/15/2024, 13:14 EDT.

## 2024-07-15 NOTE — SIGNIFICANT NOTE
07/15/24 0816   OTHER   Discipline occupational therapist   Rehab Time/Intention   Session Not Performed   (sleeping soundly)

## 2024-07-15 NOTE — SIGNIFICANT NOTE
[facility]   Wound Evaluation / Progress Note       Patient Name: Fermín Junior    : 1940    MRN: [id number]  Today's Date: 7/15/2024                     Admit Date: 2024    Visit Dx:    ICD-10-CM ICD-9-CM   1. Acute cystitis without hematuria  N30.00 595.0   2. Decreased activities of daily living (ADL)  Z78.9 V49.89   3. Difficulty in walking  R26.2 719.7       Patient Active Problem List   Diagnosis    UTI (urinary tract infection)        Past Medical History:   Diagnosis Date    Dementia         History reviewed. No pertinent surgical history.      Physical Assessment:  Wound 24 1424 Bilateral lower scrotum MASD (Moisture associated skin damage) (Active)   Dressing Appearance open to air 07/15/24 1200   Base red;moist 07/15/24 1200   Red (%), Wound Tissue Color 100 07/15/24 1200   Periwound intact;pink 07/15/24 1200   Periwound Temperature warm 07/15/24 1200   Periwound Skin Turgor soft 07/15/24 1200   Edges rolled/closed 07/15/24 1200   Drainage Amount none 07/15/24 1200   Care, Wound cleansed with;sterile normal saline 07/15/24 1200   Dressing Care open to air 07/15/24 1200   Periwound Care barrier ointment applied 07/15/24 1200       Wound 24 1425 Bilateral coccyx Pressure Injury (Active)   Wound Image   07/15/24 1200   Dressing Appearance open to air 07/15/24 1200   Base red;moist;blanchable 07/15/24 1200   Red (%), Wound Tissue Color 100 07/15/24 1200   Periwound intact;redness 07/15/24 1200   Periwound Temperature warm 07/15/24 1200   Periwound Skin Turgor soft 07/15/24 1200   Edges rolled/closed 07/15/24 1200   Drainage Amount none 07/15/24 1200   Care, Wound cleansed with;sterile normal saline 07/15/24 1200   Dressing Care open to air;skin barrier agent applied 07/15/24 1200   Periwound Care moisturizer applied 07/15/24 1200        Wound Check / Follow-up:  Seen today on 4 NT for wound RN triggered eval. Skin integrity inspected. States doesn't have much of an appetite and  hasn't been eating a lot lately. Discussed the importance of nutrition and supplementation of boost shakes. Verbalized understanding. Integrity of heels is intact. Blanchable redness noted to buttocks and scrotal area. Cleansed with normal saline. Recommend applying a thin layer of orange top cream BID as well as practice muticous skin hygiene. Recommend implementing a turn and reposition every 2 hour schedule; keep skin dry and free of moisture/incontinence, and float heels at all time while in bed.    Impression: Blanchable redden areas to buttocks    Short term goals:  Skin protection measures, moisture prevention, maintain skin integrity, increase nutritional status    Ankush Leyva RN,[facility]    7/15/2024    12:48 EDT

## 2024-07-15 NOTE — PLAN OF CARE
Goal Outcome Evaluation:              Outcome Evaluation: Patient chooses not to take medication. Resistent to eat or drink. Encouraging him to eat and drink. Denies pain, no signs of pain noted, can be resistent to tunring and repositioning. Remains fall risk and interventions in place. Careplan reviwed and updated

## 2024-07-15 NOTE — CONSULTS
"Nutrition Services    Patient Name: Fermín Junior  YOB: 1940  MRN: [id number]  Admission date: 7/9/2024      CLINICAL NUTRITION ASSESSMENT      Reason for Assessment  MST Score 2+ and Nursing consult and Pressure Injury      H&P:  Past Medical History:   Diagnosis Date    Dementia         Current Problems:   Active Hospital Problems    Diagnosis     **UTI (urinary tract infection)         Nutrition/Diet History         Narrative   Pt alert at tie of visit, but pleasantly confused. Note pt has a hx of dementia. Pt wasn't able to tell me much because he didn't know or couldn't remember. Per EMR, pt eating 0% of meals. Pt did say he like milkshakes; RD to trial and continue to monitor.      Anthropometrics        Current Height, Weight Height: 177.8 cm (70\")  Weight: 57 kg (125 lb 10.6 oz)   Current BMI Body mass index is 18.03 kg/m².   BMI Classification Underweight   % IBW 78%   Adjusted Body Weight (ABW)    Weight Hx  Wt Readings from Last 30 Encounters:   07/10/24 0417 57 kg (125 lb 10.6 oz)   07/10/24 0030 58.8 kg (129 lb 10.1 oz)          Wt Change Observation No wt trends per EMR      Estimated/Assessed Needs  Estimated Needs based on: Current Body Weight       Energy Requirements 25-30 kcal/kg   EST Needs (kcal/day) 9733-1504 kcal/d        Protein Requirements 1.2-1.5 g.kg   EST Daily Needs (g/day) 68-85 g/d        Fluid Requirements 1 ml/kcal    Estimated Needs (mL/day) 1460-1889 mL/d      Labs/Medications         Pertinent Labs Reviewed.   Results from last 7 days   Lab Units 07/09/24  1825   SODIUM mmol/L 141   POTASSIUM mmol/L 4.4   CHLORIDE mmol/L 105   CO2 mmol/L 27.7   BUN mg/dL 27*   CREATININE mg/dL 0.87   CALCIUM mg/dL 8.8   BILIRUBIN mg/dL <0.2   ALK PHOS U/L 95   ALT (SGPT) U/L 7   AST (SGOT) U/L 13   GLUCOSE mg/dL 104*     Results from last 7 days   Lab Units 07/09/24  1825   MAGNESIUM mg/dL 2.2   HEMOGLOBIN g/dL 11.1*   HEMATOCRIT % 34.6*     No results found for: \"COVID19\"  No results " "found for: \"HGBA1C\"      Pertinent Medications Reviewed.     Malnutrition Severity Assessment      Patient meets criteria for : Severe Malnutrition  Malnutrition Type (Last 8 Hours)       Malnutrition Severity Assessment       Row Name 07/15/24 1402       Malnutrition Severity Assessment    Malnutrition Type Chronic Disease - Related Malnutrition      Row Name 07/15/24 1402       Muscle Loss    Loss of Muscle Mass Findings Severe    [place] Severe - deep hollowing/scooping, lack of muscle to touch, facial bones well defined    Clavicle Bone Region Severe - protruding prominent bone      Row Name 07/15/24 1402       Fat Loss    Subcutaneous Fat Loss Findings Severe    Upper Arm Region Severe - mostly skin, very little space between folds, fingers touch      Row Name 07/15/24 1402       Criteria Met (Must meet criteria for severity in at least 2 of these categories: M Wasting, Fat Loss, Fluid, Secondary Signs, Wt. Status, Intake)    Patient meets criteria for  Severe Malnutrition                     Nutrition Diagnosis         Nutrition Dx Problem 1 Severe malnutrition related to  chronic disease: dementia  as evidenced by  severe muscle loss of the temporal and clavicle region and severe subcutaneous fat loss of the triceps region      Nutrition Intervention           Current Nutrition Orders & Evaluation of Intake       Current PO Diet Diet: Regular/House; Fluid Consistency: Thin (IDDSI 0)   Supplement No active supplement orders           Nutrition Intervention/Prescription        Recommend to continue current diet order   Recommend Mighty Shake TID to aid in caloric intake         Medical Nutrition Therapy/Nutrition Education          Learner     Readiness Patient  N/A     Method     Response N/A  N/A     Monitor/Evaluation        Monitor Monitpr PO/ONS intake, wt trends, and labs      Nutrition Discharge Plan         To be determined     Electronically signed by:  Purnima Powell RD  07/15/24 14:02 EDT   "

## 2024-07-16 PROBLEM — E43 SEVERE MALNUTRITION: Status: ACTIVE | Noted: 2024-07-16

## 2024-07-16 PROCEDURE — 99232 SBSQ HOSP IP/OBS MODERATE 35: CPT | Performed by: FAMILY MEDICINE

## 2024-07-16 RX ORDER — LORAZEPAM 2 MG/ML
0.5 CONCENTRATE ORAL
Status: ACTIVE | OUTPATIENT
Start: 2024-07-16 | End: 2024-07-23

## 2024-07-16 RX ORDER — LORAZEPAM 2 MG/ML
0.5 INJECTION INTRAMUSCULAR
Status: ACTIVE | OUTPATIENT
Start: 2024-07-16 | End: 2024-07-23

## 2024-07-16 RX ORDER — ACETAMINOPHEN 325 MG/1
650 TABLET ORAL EVERY 6 HOURS PRN
Status: DISCONTINUED | OUTPATIENT
Start: 2024-07-16 | End: 2024-07-26 | Stop reason: HOSPADM

## 2024-07-16 RX ORDER — PANTOPRAZOLE SODIUM 20 MG/1
20 TABLET, DELAYED RELEASE ORAL DAILY PRN
Status: DISCONTINUED | OUTPATIENT
Start: 2024-07-16 | End: 2024-07-26 | Stop reason: HOSPADM

## 2024-07-16 RX ORDER — ATROPINE SULFATE 10 MG/ML
2 SOLUTION/ DROPS OPHTHALMIC
Status: DISCONTINUED | OUTPATIENT
Start: 2024-07-16 | End: 2024-07-26 | Stop reason: HOSPADM

## 2024-07-16 RX ORDER — HALOPERIDOL 2 MG/ML
0.5 SOLUTION ORAL
Status: DISCONTINUED | OUTPATIENT
Start: 2024-07-16 | End: 2024-07-26 | Stop reason: HOSPADM

## 2024-07-16 RX ORDER — LORAZEPAM 0.5 MG/1
0.5 TABLET ORAL
Status: ACTIVE | OUTPATIENT
Start: 2024-07-16 | End: 2024-07-23

## 2024-07-16 RX ORDER — GABAPENTIN 100 MG/1
100 CAPSULE ORAL EVERY 8 HOURS PRN
Status: DISCONTINUED | OUTPATIENT
Start: 2024-07-16 | End: 2024-07-26 | Stop reason: HOSPADM

## 2024-07-16 RX ADMIN — POLYETHYLENE GLYCOL 3350 17 G: 17 POWDER, FOR SOLUTION ORAL at 13:31

## 2024-07-16 NOTE — PLAN OF CARE
Goal Outcome Evaluation:  Plan of Care Reviewed With: patient           Outcome Evaluation: Patient refused to take any of his meds. Provided education on the benefits of the meds. Denies pain. Rested most of the night. Continue care plan.

## 2024-07-16 NOTE — NURSING NOTE
Call placed to patients grandson. Inquired about copy of POA paperwork- patients grandson reports he will be bringing that in today for our records. Palliative care will continue to follow up.    Yenny HOSKINS, RN, PN  Palliative Care

## 2024-07-16 NOTE — PLAN OF CARE
Goal Outcome Evaluation:  Plan of Care Reviewed With: patient        Progress: no change  Outcome Evaluation: Alert and oriented to self only. x1 s/s of pain/discomfort, patient refused pain medication. Refused all meds and iv. Went comfort care this shift. Bed alarm on.

## 2024-07-16 NOTE — SIGNIFICANT NOTE
07/16/24 0924   OTHER   Discipline occupational therapist   Rehab Time/Intention   Session Not Performed patient unavailable for treatment  (With nurse)

## 2024-07-16 NOTE — NURSING NOTE
"Updated by provider regarding patients current condition. Met with patients grandson and obtained medical POA paperwork- copy placed on file in chart. Discussed goals of care and care options including education on comfort measures only. At time of visit patient is pleasantly confused, but reports he \"just wants them to stop coming in here and bothering me.\" Emotional support provided. Discussed [facility] services and discharge options as patient is not actively dying. Patients grandson reports he can private pay at a facility for long term care. Discussed [facility] referral, patients grandson agreeable. Provided education on MOST form- completed form with patients grandson. Ultimately- patients grandson requests patient transition to comfort measures only. During visit patient has no verbal/nonverbal signs/symptoms of discomfort- patient with a current PPS of 30%. Updated primary rn, provider, and unit . Palliative care will continue to follow to support and assist.    Yenny HOSKINS, RN, CHPN  Palliative Care    "

## 2024-07-16 NOTE — SIGNIFICANT NOTE
07/16/24 1230   Coping/Psychosocial   Observed Emotional State calm;cooperative   Verbalized Emotional State anxiety;other (see comments)  (The Pt has a question about heaven.)   Trust Relationship/Rapport empathic listening provided   Involvement in Care interacting with patient   Diversional Activities smartphone   Additional Documentation Spiritual Care (Group)   Spiritual Care   Use of Spiritual Resources spirituality for coping, indicated strong use of;prayer   Spiritual Care Source  initiative   Spiritual Care Follow-Up follow-up, none required as presently assessed   Response to Spiritual Care receptive of support;thanks expressed;engaged in conversation   Spiritual Care Interventions supportive conversation provided;prayer support provided   Spiritual Care Visit Type initial   Spiritual Care Request mood/anxiety support;prayer support   Receptivity to Spiritual Care visit welcomed

## 2024-07-16 NOTE — ACP (ADVANCE CARE PLANNING)
MOST completed with patients grandson in accordance with patients living will on file. Patients [facility] is [facility].     Patient is a DNR/DNI and comfort measures only. No IV antibiotics/fluids and no feeding tube.    Yenny HOSKINS, RN, CHPN  Palliative Care

## 2024-07-16 NOTE — PROGRESS NOTES
" [facility]   Hospitalist Progress Note  Date: 2024  Patient Name: Fermín Junior  : 1940  MRN: [id number]  Date of admission: 2024      Subjective   Subjective     Chief Complaint: Follow-up urinary tract infection    Summary:Fermín Junior is a 84 y.o. male  with a past medical history of dementia, hypertension, anxiety presented to the ED for evaluation of decreased appetite, failure to thrive, not taking his medications.  As per the reports, patient is currently staying at home, unable to perform ADLs and is requiring 2 people assistance.  As per the family, patient is needing 24-hour care and they are unable to provide at home at this time.  POA is patient's grandson and he is now taking care of the patient.  Unable to obtain any history from the patient due to dementia.  He is alert and oriented to self.  Case management has been consulted while the patient was in the ED and had discussion with the family.  Patient was found to have urinary tract infection, patient declined treatment, he refused multiple medications, he refused lab checks.  Clinically he is stable however with no obvious signs or symptoms of worsening infection.  Discussed goals of care with the patient and grandson/POA.  Both agreed they wanted to just pursue comfort measures only at this time.  Palliative care consulted.  MOST form completed.  Working on skilled nursing palliative care placement.    Interval Followup: Patient lying in bed appears to be resting fairly comfortably with grandson at the bedside.  Patient indicates that he just wants to be kept comfortable wants to not be bothered anymore.  Patient indicates does not have an appetite and does not want anything to stimulate an appetite.  Patient voices understanding of the consequences of his decision and states that he's \"84 and ready to go\".  Afebrile overnight.  Heart rate within normal limits.  Blood pressure low normal limits.    Review of Systems  Constitutional: " Positive for fatigue and negative fever.   HENT: Negative for sore throat and trouble swallowing.    Eyes: Negative for pain and discharge.   Respiratory: Negative for cough and shortness of breath.    Cardiovascular: Negative for chest pain and palpitations.   Gastrointestinal: Negative for abdominal pain, nausea and vomiting.   Endocrine: Negative for cold intolerance and heat intolerance.   Genitourinary: Negative for difficulty urinating and dysuria.   Musculoskeletal: Negative for back pain and neck stiffness.   Skin: Negative for color change and rash.   Neurological: Negative for syncope and headaches.   Hematological: Negative for adenopathy.   Psychiatric/Behavioral: Negative for confusion and hallucinations.    Objective   Objective     Vitals:   Temp:  [97.2 °F (36.2 °C)-97.9 °F (36.6 °C)] 97.2 °F (36.2 °C)  Heart Rate:  [77-85] 77  Resp:  [18] 18  BP: ()/(55-77) 93/57  Physical Exam   General: well-developed appearing stated age thin frail appearing muscle wasting bilateral upper and lower extremities in no acute distress  HEENT: Normocephalic atraumatic moist membranes pupils equal round reactive light, no scleral icterus no conjunctival injection  Cardiovascular: regular rate and rhythm no murmurs rubs or gallops S1-S2, no lower extremity edema appreciated  Pulmonary: Clear to auscultation bilaterally no wheezes rales or rhonchi symmetric chest expansion, unlabored, no conversational dyspnea appreciated  Gastrointestinal: Soft nontender nondistended positive bowel sounds all 4 quadrants no rebound or guarding  Musculoskeletal: No clubbing cyanosis, warm and well-perfused, calves soft symmetric nontender bilaterally  Skin: Clean dry without rashes  Neuro: Cranial nerves II through XII intact grossly no sensorimotor deficits appreciated bilateral upper and lower extremities  Psych: Patient is calm cooperative and appropriate with exam not responding to internal stimuli  : No Page catheter no  bladder distention no suprapubic tenderness    Result Review    Result Review:  I have personally reviewed these results and agree with these findings:  [x]  Laboratory  LAB RESULTS:      Lab 07/09/24 1825   WBC 6.35   HEMOGLOBIN 11.1*   HEMATOCRIT 34.6*   PLATELETS 180   NEUTROS ABS 3.84   IMMATURE GRANS (ABS) 0.01   LYMPHS ABS 1.62   MONOS ABS 0.78   EOS ABS 0.07   MCV 99.1*         Lab 07/09/24 2202 07/09/24  1825   SODIUM  --  141   POTASSIUM  --  4.4   CHLORIDE  --  105   CO2  --  27.7   ANION GAP  --  8.3   BUN  --  27*   CREATININE  --  0.87   EGFR  --  85.6   GLUCOSE  --  104*   CALCIUM  --  8.8   MAGNESIUM  --  2.2   TSH 2.550  --          Lab 07/09/24 1825   TOTAL PROTEIN 6.5   ALBUMIN 3.4*   GLOBULIN 3.1   ALT (SGPT) 7   AST (SGOT) 13   BILIRUBIN <0.2   ALK PHOS 95         Lab 07/09/24 2202 07/09/24 1825   HSTROP T 38* 43*             Lab 07/09/24  1825   VITAMIN B 12 717         Brief Urine Lab Results  (Last result in the past 365 days)        Color   Clarity   Blood   Leuk Est   Nitrite   Protein   CREAT   Urine HCG        07/09/24 2133 Yellow   Cloudy   Moderate (2+)   Large (3+)   Positive   30 mg/dL (1+)                 Microbiology Results (last 10 days)       Procedure Component Value - Date/Time    Urine Culture - Urine, Urine, Clean Catch [[id number]]  (Abnormal)  (Susceptibility) Collected: 07/09/24 2133    Lab Status: Final result Specimen: Urine, Clean Catch Updated: 07/12/24 1040     Urine Culture >100,000 CFU/mL Escherichia coli    Narrative:      Colonization of the urinary tract without infection is common. Treatment is discouraged unless the patient is symptomatic, pregnant, or undergoing an invasive urologic procedure.    Susceptibility        Escherichia coli      SAYRA      Amikacin Susceptible      Amoxicillin + Clavulanate Resistant      Ampicillin Resistant      Ampicillin + Sulbactam Resistant      Cefazolin Susceptible      Cefepime Susceptible      Ceftazidime Susceptible       Ceftriaxone Susceptible      Gentamicin Resistant      Levofloxacin Susceptible      Nitrofurantoin Susceptible      Piperacillin + Tazobactam Resistant      Tobramycin Intermediate      Trimethoprim + Sulfamethoxazole Resistant                                   [x]  Microbiology  [x]  Radiology  CT Head Without Contrast    Result Date: 7/10/2024  Impression: No acute intracranial abnormality Electronically Signed: Tomasz Harris MD  7/10/2024 9:12 PM EDT  Workstation ID: [id number]    XR Chest 1 View    Result Date: 7/9/2024  Impression: No acute pulmonary process Electronically Signed: Tomasz Harris MD  7/9/2024 7:37 PM EDT  Workstation ID: [id number]     []  EKG/Telemetry   []  Cardiology/Vascular   []  Pathology  []  Old records  [x]  Other:  Scheduled Meds:   Continuous Infusions:   PRN Meds:.  acetaminophen    atropine    senna-docusate sodium **AND** polyethylene glycol **AND** bisacodyl **AND** bisacodyl    gabapentin    haloperidol    LORazepam **OR** LORazepam **OR** LORazepam    pantoprazole    Polyvinyl Alcohol-Povidone PF      Assessment & Plan   Assessment / Plan     Assessment/Plan:  Failure to thrive  Hypertension  Urinary tract infection  Dementia        Patient admitted for further evaluation and treatment  After discussion with patient and family regards to goals of care decision was made to make comfort measures only  Change vitals to every shift  Diet as tolerated  Start Ativan for anxiety as needed  Start Haldol for agitation as needed  Change gabapentin to as needed  Start artificial tears as needed  Start Tylenol as needed for pain  Palliative care consulted thank you for your assistance  Further patient was recommendations pending clinical course       Discussed plan with bedside RN.    Disposition: Family and discharge planning looking for skilled nursing palliative care placement.    VTE Prophylaxis:  Mechanical VTE prophylaxis orders are present.        CODE STATUS:   Code Status (Patient  has no pulse and is not breathing): No CPR (Do Not Attempt to Resuscitate)  Medical Interventions (Patient has pulse or is breathing): Comfort Measures

## 2024-07-17 PROCEDURE — 99231 SBSQ HOSP IP/OBS SF/LOW 25: CPT | Performed by: FAMILY MEDICINE

## 2024-07-17 NOTE — PLAN OF CARE
Goal Outcome Evaluation:            Patient remins on comfort care. Only drinking milk no other po intake, patient not in any pain or distress.

## 2024-07-17 NOTE — PROGRESS NOTES
[facility]   Hospitalist Progress Note  Date: 2024  Patient Name: Fermín Junior  : 1940  MRN: [id number]  Date of admission: 2024      Subjective   Subjective     Chief Complaint: Follow-up urinary tract infection    Summary:Fermín Junior is a 84 y.o. male  with a past medical history of dementia, hypertension, anxiety presented to the ED for evaluation of decreased appetite, failure to thrive, not taking his medications.  As per the reports, patient is currently staying at home, unable to perform ADLs and is requiring 2 people assistance.  As per the family, patient is needing 24-hour care and they are unable to provide at home at this time.  POA is patient's grandson and he is now taking care of the patient.  Unable to obtain any history from the patient due to dementia.  He is alert and oriented to self.  Case management has been consulted while the patient was in the ED and had discussion with the family.  Patient was found to have urinary tract infection, patient declined treatment, he refused multiple medications, he refused lab checks.  Clinically he is stable however with no obvious signs or symptoms of worsening infection.  Discussed goals of care with the patient and grandson/POA.  Both agreed they wanted to just pursue comfort measures only at this time.  Palliative care consulted.  MOST form completed.  Working on skilled nursing palliative care placement.    Interval Followup: Patient lying in bed appears to be resting fairly comfortably with grandson at the bedside.  Patient denies any new issues or needs.  Afebrile overnight.  Heart rate within normal limits.      Review of Systems  Constitutional: Positive for fatigue and negative fever.   HENT: Negative for sore throat and trouble swallowing.    Eyes: Negative for pain and discharge.   Respiratory: Negative for cough and shortness of breath.    Cardiovascular: Negative for chest pain and palpitations.   Gastrointestinal: Negative for  abdominal pain, nausea and vomiting.   Endocrine: Negative for cold intolerance and heat intolerance.   Genitourinary: Negative for difficulty urinating and dysuria.   Musculoskeletal: Negative for back pain and neck stiffness.   Skin: Negative for color change and rash.   Neurological: Negative for syncope and headaches.   Hematological: Negative for adenopathy.   Psychiatric/Behavioral: Negative for confusion and hallucinations.    Objective   Objective     Vitals:   Temp:  [96 °F (35.6 °C)-97.7 °F (36.5 °C)] 96 °F (35.6 °C)  Heart Rate:  [73-80] 73  Resp:  [17-20] 17  BP: (90-95)/(55-56) 95/56  Physical Exam   General: well-developed appearing stated age thin frail appearing muscle wasting bilateral upper and lower extremities in no acute distress  HEENT: Normocephalic atraumatic moist membranes pupils equal round reactive light, no scleral icterus no conjunctival injection  Cardiovascular: regular rate and rhythm no murmurs rubs or gallops S1-S2, no lower extremity edema appreciated  Pulmonary: Clear to auscultation bilaterally no wheezes rales or rhonchi symmetric chest expansion, unlabored, no conversational dyspnea appreciated  Gastrointestinal: Soft nontender nondistended positive bowel sounds all 4 quadrants no rebound or guarding  Musculoskeletal: No clubbing cyanosis, warm and well-perfused, calves soft symmetric nontender bilaterally  Skin: Clean dry without rashes  Neuro: Cranial nerves II through XII intact grossly no sensorimotor deficits appreciated bilateral upper and lower extremities  Psych: Patient is calm cooperative and appropriate with exam not responding to internal stimuli  : No Page catheter no bladder distention no suprapubic tenderness    Result Review    Result Review:  I have personally reviewed these results and agree with these findings:  [x]  Laboratory  LAB RESULTS:                                        Brief Urine Lab Results  (Last result in the past 365 days)        Color    Clarity   Blood   Leuk Est   Nitrite   Protein   CREAT   Urine HCG        07/09/24 2133 Yellow   Cloudy   Moderate (2+)   Large (3+)   Positive   30 mg/dL (1+)                 Microbiology Results (last 10 days)       Procedure Component Value - Date/Time    Urine Culture - Urine, Urine, Clean Catch [[id number]]  (Abnormal)  (Susceptibility) Collected: 07/09/24 2133    Lab Status: Final result Specimen: Urine, Clean Catch Updated: 07/12/24 1040     Urine Culture >100,000 CFU/mL Escherichia coli    Narrative:      Colonization of the urinary tract without infection is common. Treatment is discouraged unless the patient is symptomatic, pregnant, or undergoing an invasive urologic procedure.    Susceptibility        Escherichia coli      SAYRA      Amikacin Susceptible      Amoxicillin + Clavulanate Resistant      Ampicillin Resistant      Ampicillin + Sulbactam Resistant      Cefazolin Susceptible      Cefepime Susceptible      Ceftazidime Susceptible      Ceftriaxone Susceptible      Gentamicin Resistant      Levofloxacin Susceptible      Nitrofurantoin Susceptible      Piperacillin + Tazobactam Resistant      Tobramycin Intermediate      Trimethoprim + Sulfamethoxazole Resistant                                   [x]  Microbiology  [x]  Radiology  CT Head Without Contrast    Result Date: 7/10/2024  Impression: No acute intracranial abnormality Electronically Signed: Tomasz Harris MD  7/10/2024 9:12 PM EDT  Workstation ID: [id number]    XR Chest 1 View    Result Date: 7/9/2024  Impression: No acute pulmonary process Electronically Signed: Tomasz Harris MD  7/9/2024 7:37 PM EDT  Workstation ID: [id number]     []  EKG/Telemetry   []  Cardiology/Vascular   []  Pathology  []  Old records  [x]  Other:  Scheduled Meds:   Continuous Infusions:   PRN Meds:.  acetaminophen    atropine    senna-docusate sodium **AND** polyethylene glycol **AND** bisacodyl **AND** bisacodyl    gabapentin    haloperidol    LORazepam **OR** LORazepam  **OR** LORazepam    pantoprazole    Polyvinyl Alcohol-Povidone PF      Assessment & Plan   Assessment / Plan     Assessment/Plan:  Failure to thrive  Hypertension  Urinary tract infection  Dementia  Severe protein calorie malnutrition        Patient admitted for further evaluation and treatment  After discussion with patient and family regards to goals of care decision was made to make comfort measures only  Continue diet as tolerated  Continue Ativan for anxiety as needed  Continue Haldol for agitation as needed  Continue gabapentin to as needed  Continue artificial tears as needed  Continue Tylenol as needed for pain  Palliative care consulted thank you for your assistance  Further patient was recommendations pending clinical course       Discussed plan with bedside RN.    Disposition: Family and discharge planning looking for skilled nursing placement with hospice services.    VTE Prophylaxis:  Mechanical VTE prophylaxis orders are present.        CODE STATUS:   Code Status (Patient has no pulse and is not breathing): No CPR (Do Not Attempt to Resuscitate)  Medical Interventions (Patient has pulse or is breathing): Comfort Measures

## 2024-07-17 NOTE — NURSING NOTE
Collaborated with [facility] RN- patient is eligible for [facility] services. Awaiting facility private pay placement. Patients family would like [facility] to follow up at discharge. Palliative care will continue to follow to support and assist.    Yenny HOSKINS, RN, PN  Palliative Care

## 2024-07-17 NOTE — PLAN OF CARE
Goal Outcome Evaluation:           Progress: no change  Outcome Evaluation: Patient a&o to self only. No complaints or s/s of pain or discomfort. Assissted with turning and repositoning in bed. Sleeping in between care.

## 2024-07-17 NOTE — NURSING NOTE
Patient is currently comfort measures only, at time of visit patient appears comfortable with no verbal/nonverbal signs/symptoms of distress noted during visit. Patient with a current PPS of 30%. No family at bedside at time of visit. [facility] scheduled for today, 7/17/24 at 1330. Plan for patient to discharge to facility with [facility] services when placement is available. Patient will be private paying at facility. Palliative care will continue to follow to support and assist.    Yenny HOSKINS, RN, PN  Palliative Care

## 2024-07-18 PROCEDURE — 99231 SBSQ HOSP IP/OBS SF/LOW 25: CPT | Performed by: FAMILY MEDICINE

## 2024-07-18 NOTE — NURSING NOTE
"Patient is comfort measures only. At time of visit patient appears comfortable with no verbal/nonverbal signs/symptoms of discomfort noted. Patient reportedly verbalizing he is \"ready to meet Camilo.\" Emotional support provided. Patient awaiting private pay at facility with [facility] care. Palliative care will continue to follow to support and assist.    Yenny HOSKINS, RN, PN  Palliative Care    "

## 2024-07-18 NOTE — PLAN OF CARE
Goal Outcome Evaluation:           Progress: no change  Outcome Evaluation: pleasant patient alert x2 rested in bed throughout the day with no complaints of pain or discomfort. Patient continues to sip on milk regularly. Skin care provided. Patient states no anxiety. Family at bedside throughout the day. Patient stated to be that he was ready to go see Camilo.  consulted and came to see patient. No concerns, continue plan of care

## 2024-07-18 NOTE — PLAN OF CARE
Goal Outcome Evaluation:  Plan of Care Reviewed With: patient        Progress: no change  Outcome Evaluation: Pt showed no s/s pain or discomfort this shift. Pt is comfort measures only. Skin care provided as ordered and needed. No new issues or new needs noted at this time.

## 2024-07-18 NOTE — PROGRESS NOTES
[facility]   Hospitalist Progress Note  Date: 2024  Patient Name: Fermín Junior  : 1940  MRN: [id number]  Date of admission: 2024      Subjective   Subjective     Chief Complaint: Follow-up urinary tract infection    Summary:Fermín Junior is a 84 y.o. male  with a past medical history of dementia, hypertension, anxiety presented to the ED for evaluation of decreased appetite, failure to thrive, not taking his medications.  As per the reports, patient is currently staying at home, unable to perform ADLs and is requiring 2 people assistance.  As per the family, patient is needing 24-hour care and they are unable to provide at home at this time.  POA is patient's grandson and he is now taking care of the patient.  Unable to obtain any history from the patient due to dementia.  He is alert and oriented to self.  Case management has been consulted while the patient was in the ED and had discussion with the family.  Patient was found to have urinary tract infection, patient declined treatment, he refused multiple medications, he refused lab checks.  Clinically he is stable however with no obvious signs or symptoms of worsening infection.  Discussed goals of care with the patient and grandson/POA.  Both agreed they wanted to just pursue comfort measures only at this time.  Palliative care consulted.  MOST form completed.  Working on skilled nursing palliative care placement.    Interval Followup: Patient lying in bed appears to be resting comfortably with grandson at the bedside.  No new issues per nursing.  Afebrile overnight.  Heart rate within normal limits.      Review of Systems  Constitutional: Positive for fatigue and negative fever.   HENT: Negative for sore throat and trouble swallowing.    Eyes: Negative for pain and discharge.   Respiratory: Negative for cough and shortness of breath.    Cardiovascular: Negative for chest pain and palpitations.   Gastrointestinal: Negative for abdominal pain, nausea  and vomiting.   Endocrine: Negative for cold intolerance and heat intolerance.   Genitourinary: Negative for difficulty urinating and dysuria.   Musculoskeletal: Negative for back pain and neck stiffness.   Skin: Negative for color change and rash.   Neurological: Negative for syncope and headaches.   Hematological: Negative for adenopathy.   Psychiatric/Behavioral: Negative for confusion and hallucinations.    Objective   Objective     Vitals:   Temp:  [97.2 °F (36.2 °C)-97.3 °F (36.3 °C)] 97.3 °F (36.3 °C)  Heart Rate:  [79-89] 89  Resp:  [18] 18  BP: (93)/(53-58) 93/58  Physical Exam   General: well-developed appearing stated age thin frail appearing muscle wasting bilateral upper and lower extremities in no acute distress  HEENT: Normocephalic atraumatic moist membranes   Cardiovascular:  no lower extremity edema appreciated  Pulmonary: symmetric chest expansion, unlabored,   Gastrointestinal: Soft nontender nondistended positive bowel sounds all 4 quadrants no rebound or guarding  Musculoskeletal: No clubbing cyanosis, warm and well-perfused, calves soft symmetric nontender bilaterally  Skin: Clean dry without rashes  Neuro: Cranial nerves II through XII intact grossly no sensorimotor deficits appreciated bilateral upper and lower extremities  Psych: Patient is calm cooperative and appropriate with exam not responding to internal stimuli  : No Page catheter no bladder distention     Result Review    Result Review:  I have personally reviewed these results and agree with these findings:  [x]  Laboratory  LAB RESULTS:                                        Brief Urine Lab Results  (Last result in the past 365 days)        Color   Clarity   Blood   Leuk Est   Nitrite   Protein   CREAT   Urine HCG        07/09/24 2133 Yellow   Cloudy   Moderate (2+)   Large (3+)   Positive   30 mg/dL (1+)                 Microbiology Results (last 10 days)       Procedure Component Value - Date/Time    Urine Culture - Urine,  Urine, Clean Catch [[id number]]  (Abnormal)  (Susceptibility) Collected: 07/09/24 2133    Lab Status: Final result Specimen: Urine, Clean Catch Updated: 07/12/24 1040     Urine Culture >100,000 CFU/mL Escherichia coli    Narrative:      Colonization of the urinary tract without infection is common. Treatment is discouraged unless the patient is symptomatic, pregnant, or undergoing an invasive urologic procedure.    Susceptibility        Escherichia coli      SAYRA      Amikacin Susceptible      Amoxicillin + Clavulanate Resistant      Ampicillin Resistant      Ampicillin + Sulbactam Resistant      Cefazolin Susceptible      Cefepime Susceptible      Ceftazidime Susceptible      Ceftriaxone Susceptible      Gentamicin Resistant      Levofloxacin Susceptible      Nitrofurantoin Susceptible      Piperacillin + Tazobactam Resistant      Tobramycin Intermediate      Trimethoprim + Sulfamethoxazole Resistant                                   [x]  Microbiology  [x]  Radiology  CT Head Without Contrast    Result Date: 7/10/2024  Impression: No acute intracranial abnormality Electronically Signed: Tomasz Harris MD  7/10/2024 9:12 PM EDT  Workstation ID: [id number]     []  EKG/Telemetry   []  Cardiology/Vascular   []  Pathology  []  Old records  [x]  Other:  Scheduled Meds:   Continuous Infusions:   PRN Meds:.  acetaminophen    atropine    senna-docusate sodium **AND** polyethylene glycol **AND** bisacodyl **AND** bisacodyl    gabapentin    haloperidol    LORazepam **OR** LORazepam **OR** LORazepam    pantoprazole    Polyvinyl Alcohol-Povidone PF      Assessment & Plan   Assessment / Plan     Assessment/Plan:  Failure to thrive  Hypertension  Urinary tract infection  Dementia  Severe protein calorie malnutrition        Patient admitted for further evaluation and treatment  After discussion with patient and family regards to goals of care decision was made to make comfort measures only  Continue diet as tolerated.  Patient  requesting milkshakes  Continue Ativan for anxiety as needed  Continue Haldol for agitation as needed  Continue gabapentin to as needed  Continue artificial tears as needed  Continue Tylenol as needed for pain  Palliative care consulted thank you for your assistance  Further patient was recommendations pending clinical course       Discussed plan with bedside RN.    Disposition: Family and discharge planning looking for skilled nursing placement with hospice services.    VTE Prophylaxis:  Mechanical VTE prophylaxis orders are present.        CODE STATUS:   Code Status (Patient has no pulse and is not breathing): No CPR (Do Not Attempt to Resuscitate)  Medical Interventions (Patient has pulse or is breathing): Comfort Measures

## 2024-07-19 PROCEDURE — 99231 SBSQ HOSP IP/OBS SF/LOW 25: CPT | Performed by: FAMILY MEDICINE

## 2024-07-19 NOTE — PLAN OF CARE
Goal Outcome Evaluation:  Plan of Care Reviewed With: patient        Progress: no change  Outcome Evaluation: Pt is comfort measures only. Pt is alert and oriented x2 this shift. Pt showed no s/s of pain or distress this shift. Pt being turned and repositioned Q2H. Skin care provided as ordered and needed. No new issues or new needs noted at this time.

## 2024-07-19 NOTE — PLAN OF CARE
"Goal Outcome Evaluation:           Progress: no change  Outcome Evaluation: pleasant patient rested in bed throughout the day with no complaints of pain or discomfort or anxiety. Patient voiced sadness because \"he hasn't gone to [facility] yet to be with Camilo.\" Patient visited by family and primary MD. Skin care provided per order. Patient had incontinent BM this shift. Awaiting facility placement. Continues to eat very little if any food and drinks milk. Continue plan of care, no concerns.                               "

## 2024-07-20 PROCEDURE — 99231 SBSQ HOSP IP/OBS SF/LOW 25: CPT | Performed by: FAMILY MEDICINE

## 2024-07-20 NOTE — PROGRESS NOTES
[facility]   Hospitalist Progress Note  Date: 2024  Patient Name: Fermín Junior  : 1940  MRN: [id number]  Date of admission: 2024      Subjective   Subjective     Chief Complaint: Follow-up urinary tract infection    Summary:Fermín Junior is a 84 y.o. male  with a past medical history of dementia, hypertension, anxiety presented to the ED for evaluation of decreased appetite, failure to thrive, not taking his medications.  As per the reports, patient is currently staying at home, unable to perform ADLs and is requiring 2 people assistance.  As per the family, patient is needing 24-hour care and they are unable to provide at home at this time.  POA is patient's grandson and he is now taking care of the patient.  Unable to obtain any history from the patient due to dementia.  He is alert and oriented to self.  Case management has been consulted while the patient was in the ED and had discussion with the family.  Patient was found to have urinary tract infection, patient declined treatment, he refused multiple medications, he refused lab checks.  Clinically he is stable however with no obvious signs or symptoms of worsening infection.  Discussed goals of care with the patient and grandson/POA.  Both agreed they wanted to just pursue comfort measures only at this time.  Palliative care consulted.  MOST form completed.  Working on skilled nursing palliative care placement.    Interval Followup: Patient lying in bed appears to be resting comfortably.  No new issues per nursing..  Not requiring any PRNs.   Afebrile overnight.  Heart rate within normal limits.      Review of Systems  Constitutional: Positive for fatigue and negative fever.   HENT: Negative for sore throat and trouble swallowing.    Eyes: Negative for pain and discharge.   Respiratory: Negative for cough and shortness of breath.    Cardiovascular: Negative for chest pain and palpitations.   Gastrointestinal: Negative for abdominal pain, nausea  and vomiting.   Endocrine: Negative for cold intolerance and heat intolerance.   Genitourinary: Negative for difficulty urinating and dysuria.   Musculoskeletal: Negative for back pain and neck stiffness.   Skin: Negative for color change and rash.   Neurological: Negative for syncope and headaches.   Hematological: Negative for adenopathy.   Psychiatric/Behavioral: Negative for confusion and hallucinations.    Objective   Objective     Vitals:   Temp:  [97.3 °F (36.3 °C)-97.5 °F (36.4 °C)] 97.5 °F (36.4 °C)  Heart Rate:  [74-77] 74  Resp:  [12-22] 22  BP: ()/(57-66) 96/66  Physical Exam   General: well-developed appearing stated age thin frail appearing muscle wasting bilateral upper and lower extremities in no acute distress  HEENT: Normocephalic atraumatic moist membranes   Cardiovascular:  no lower extremity edema appreciated  Pulmonary: symmetric chest expansion, unlabored,   Gastrointestinal: Soft nontender nondistended positive bowel sounds all 4 quadrants no rebound or guarding  Musculoskeletal: No clubbing cyanosis, warm and well-perfused, calves soft symmetric nontender bilaterally  Skin: Clean dry without rashes  Neuro: Cranial nerves II through XII intact grossly no sensorimotor deficits appreciated bilateral upper and lower extremities  Psych: Patient is calm cooperative and appropriate with exam not responding to internal stimuli  : No Page catheter no bladder distention     Result Review    Result Review:  I have personally reviewed these results and agree with these findings:  [x]  Laboratory  LAB RESULTS:                                        Brief Urine Lab Results  (Last result in the past 365 days)        Color   Clarity   Blood   Leuk Est   Nitrite   Protein   CREAT   Urine HCG        07/09/24 2133 Yellow   Cloudy   Moderate (2+)   Large (3+)   Positive   30 mg/dL (1+)                 Microbiology Results (last 10 days)       ** No results found for the last 240 hours. **             [x]  Microbiology  []  Radiology  No radiology results for the last 7 days    []  EKG/Telemetry   []  Cardiology/Vascular   []  Pathology  []  Old records  [x]  Other:  Scheduled Meds:   Continuous Infusions:   PRN Meds:.  acetaminophen    atropine    senna-docusate sodium **AND** polyethylene glycol **AND** bisacodyl **AND** bisacodyl    gabapentin    haloperidol    LORazepam **OR** LORazepam **OR** LORazepam    pantoprazole    Polyvinyl Alcohol-Povidone PF      Assessment & Plan   Assessment / Plan     Assessment/Plan:  Failure to thrive  Hypertension  Urinary tract infection  Dementia  Severe protein calorie malnutrition        Patient admitted for further evaluation and treatment  After discussion with patient and family regards to goals of care decision was made to make comfort measures only  Continue diet as tolerated for patient comfort.  Patient prefers milkshakes  Continue Ativan for anxiety as needed  Continue Haldol for agitation as needed  Continue gabapentin to as needed  Continue artificial tears as needed  Continue Tylenol as needed for pain  Palliative care consulted thank you for your assistance  Further patient was recommendations pending clinical course       Discussed plan with bedside RN.    Disposition: Family and discharge planning looking for skilled nursing placement with hospice services.  Currently looking at facilities in [place] to be closer to family    VTE Prophylaxis:  Mechanical VTE prophylaxis orders are present.        CODE STATUS:   Code Status (Patient has no pulse and is not breathing): No CPR (Do Not Attempt to Resuscitate)  Medical Interventions (Patient has pulse or is breathing): Comfort Measures

## 2024-07-20 NOTE — PLAN OF CARE
Goal Outcome Evaluation:           Progress: no change  Outcome Evaluation: Pt rested comfortably throughout the night. No needs noted at this time.

## 2024-07-21 PROCEDURE — 99232 SBSQ HOSP IP/OBS MODERATE 35: CPT | Performed by: FAMILY MEDICINE

## 2024-07-21 NOTE — PROGRESS NOTES
[facility]   Hospitalist Progress Note  Date: 2024  Patient Name: Fermín Junior  : 1940  MRN: [id number]  Date of admission: 2024      Subjective   Subjective     Chief Complaint: Follow-up urinary tract infection    Summary:Fermín Junior is a 84 y.o. male  with a past medical history of dementia, hypertension, anxiety presented to the ED for evaluation of decreased appetite, failure to thrive, not taking his medications.  As per the reports, patient is currently staying at home, unable to perform ADLs and is requiring 2 people assistance.  As per the family, patient is needing 24-hour care and they are unable to provide at home at this time.  POA is patient's grandson and he is now taking care of the patient.  Unable to obtain any history from the patient due to dementia.  He is alert and oriented to self.  Case management has been consulted while the patient was in the ED and had discussion with the family.  Patient was found to have urinary tract infection, patient declined treatment, he refused multiple medications, he refused lab checks.  Clinically he is stable however with no obvious signs or symptoms of worsening infection.  Discussed goals of care with the patient and grandson/POA.  Both agreed they wanted to just pursue comfort measures only at this time.  Palliative care consulted.  MOST form completed.  Working on skilled nursing palliative care placement.    Interval Followup: Patient lying in bed appears to be resting comfortably.  Patient denies any new issues only ask for prayer. Still very little PO intake per nursing. No new issues per nursing.  Not requiring any PRNs.   Afebrile overnight.  Heart rate within normal limits.      Review of Systems  Constitutional: Positive for fatigue and negative fever.   HENT: Negative for sore throat and trouble swallowing.    Eyes: Negative for pain and discharge.   Respiratory: Negative for cough and shortness of breath.    Cardiovascular: Negative  for chest pain and palpitations.   Gastrointestinal: Negative for abdominal pain, nausea and vomiting.   Endocrine: Negative for cold intolerance and heat intolerance.   Genitourinary: Negative for difficulty urinating and dysuria.   Musculoskeletal: Negative for back pain and neck stiffness.   Skin: Negative for color change and rash.   Neurological: Negative for syncope and headaches.   Hematological: Negative for adenopathy.   Psychiatric/Behavioral: Negative for confusion and hallucinations.    Objective   Objective     Vitals:   Temp:  [97.3 °F (36.3 °C)-97.4 °F (36.3 °C)] 97.4 °F (36.3 °C)  Heart Rate:  [77-88] 77  Resp:  [14-22] 22  BP: (88-89)/(53-56) 89/56  Physical Exam   General: well-developed appearing stated age thin frail appearing muscle wasting bilateral upper and lower extremities in no acute distress  HEENT: Normocephalic atraumatic moist membranes   Cardiovascular:  no lower extremity edema appreciated  Pulmonary: symmetric chest expansion, unlabored,   Gastrointestinal: Soft nontender nondistended positive bowel sounds all 4 quadrants no rebound or guarding  Musculoskeletal: No clubbing cyanosis, warm and well-perfused, calves soft symmetric nontender bilaterally  Skin: Clean dry   Neuro: Cranial nerves II through XII intact grossly no sensorimotor deficits appreciated bilateral upper and lower extremities  Psych: Patient is calm cooperative and appropriate with exam not responding to internal stimuli  : No Page catheter no bladder distention     Result Review    Result Review:  I have personally reviewed these results and agree with these findings:  [x]  Laboratory  LAB RESULTS:                                        Brief Urine Lab Results  (Last result in the past 365 days)        Color   Clarity   Blood   Leuk Est   Nitrite   Protein   CREAT   Urine HCG        07/09/24 2133 Yellow   Cloudy   Moderate (2+)   Large (3+)   Positive   30 mg/dL (1+)                 Microbiology Results (last  10 days)       ** No results found for the last 240 hours. **            [x]  Microbiology  []  Radiology  No radiology results for the last 7 days    []  EKG/Telemetry   []  Cardiology/Vascular   []  Pathology  []  Old records  [x]  Other:  Scheduled Meds:   Continuous Infusions:   PRN Meds:.  acetaminophen    atropine    senna-docusate sodium **AND** polyethylene glycol **AND** bisacodyl **AND** bisacodyl    gabapentin    haloperidol    LORazepam **OR** LORazepam **OR** LORazepam    pantoprazole    Polyvinyl Alcohol-Povidone PF      Assessment & Plan   Assessment / Plan     Assessment/Plan:  Failure to thrive  Hypertension  Urinary tract infection  Dementia  Severe protein calorie malnutrition        Patient admitted for further evaluation and treatment  After discussion with patient and family regards to goals of care decision was made to make comfort measures only  Continue diet as tolerated for patient comfort.  Continue Ativan for anxiety as needed  Continue Haldol for agitation as needed  Continue gabapentin to as needed  Continue artificial tears as needed  Continue Tylenol as needed for pain  Palliative care consulted thank you for your assistance  Further patient was recommendations pending clinical course       Discussed plan with bedside RN.    Disposition: Family and discharge planning looking for skilled nursing placement with hospice services.  Currently looking at facilities in [place] to be closer to family    VTE Prophylaxis:  Mechanical VTE prophylaxis orders are present.        CODE STATUS:   Code Status (Patient has no pulse and is not breathing): No CPR (Do Not Attempt to Resuscitate)  Medical Interventions (Patient has pulse or is breathing): Comfort Measures

## 2024-07-22 PROCEDURE — 99231 SBSQ HOSP IP/OBS SF/LOW 25: CPT | Performed by: FAMILY MEDICINE

## 2024-07-22 NOTE — PROGRESS NOTES
[facility]   Hospitalist Progress Note  Date: 2024  Patient Name: Fermín Junior  : 1940  MRN: [id number]  Date of admission: 2024      Subjective   Subjective     Chief Complaint: Follow-up urinary tract infection    Summary:Fermín Junior is a 84 y.o. male  with a past medical history of dementia, hypertension, anxiety presented to the ED for evaluation of decreased appetite, failure to thrive, not taking his medications.  As per the reports, patient is currently staying at home, unable to perform ADLs and is requiring 2 people assistance.  As per the family, patient is needing 24-hour care and they are unable to provide at home at this time.  POA is patient's grandson and he is now taking care of the patient.  Unable to obtain any history from the patient due to dementia.  He is alert and oriented to self.  Case management has been consulted while the patient was in the ED and had discussion with the family.  Patient was found to have urinary tract infection, patient declined treatment, he refused multiple medications, he refused lab checks.  Clinically he is stable however with no obvious signs or symptoms of worsening infection.  Discussed goals of care with the patient and grandson/POA.  Both agreed they wanted to just pursue comfort measures only at this time.  Palliative care consulted.  MOST form completed.  Working on skilled nursing palliative care placement.    Interval Followup: Patient lying in bed appears to be resting comfortably.  Patient denies any new issues again only asks for prayer.  No new issues per nursing.  Not requiring any comfort PRNs.   Afebrile overnight.  Heart rate within normal limits.  BP low normal limits    Review of Systems  Constitutional: Positive for fatigue and negative fever.   HENT: Negative for sore throat and trouble swallowing.    Eyes: Negative for pain and discharge.   Respiratory: Negative for cough and shortness of breath.    Cardiovascular: Negative for  chest pain and palpitations.   Gastrointestinal: Negative for abdominal pain, nausea and vomiting.   Endocrine: Negative for cold intolerance and heat intolerance.   Genitourinary: Negative for difficulty urinating and dysuria.   Musculoskeletal: Negative for back pain and neck stiffness.   Skin: Negative for color change and rash.   Neurological: Negative for syncope and headaches.   Hematological: Negative for adenopathy.   Psychiatric/Behavioral: Negative for confusion and hallucinations.    Objective   Objective     Vitals:   Temp:  [97.3 °F (36.3 °C)-99.3 °F (37.4 °C)] 99.3 °F (37.4 °C)  Heart Rate:  [78-83] 78  Resp:  [16-18] 18  BP: (86-92)/(55-64) 92/55  Physical Exam   General: well-developed appearing stated age thin frail appearing muscle wasting bilateral upper and lower extremities in no acute distress  HEENT: Normocephalic atraumatic moist membranes   Cardiovascular:  no lower extremity edema appreciated  Pulmonary: symmetric chest expansion, unlabored,   Gastrointestinal: Soft nontender nondistended positive bowel sounds all 4 quadrants no rebound or guarding  Musculoskeletal: No clubbing cyanosis, warm and well-perfused, calves soft symmetric nontender bilaterally  Skin: Clean dry   Neuro: Cranial nerves II through XII intact grossly no sensorimotor deficits appreciated bilateral upper and lower extremities  Psych: Patient is calm cooperative and appropriate with exam not responding to internal stimuli  : No Page catheter no bladder distention     Result Review    Result Review:  I have personally reviewed these results and agree with these findings:  [x]  Laboratory  LAB RESULTS:                                        Brief Urine Lab Results  (Last result in the past 365 days)        Color   Clarity   Blood   Leuk Est   Nitrite   Protein   CREAT   Urine HCG        07/09/24 2133 Yellow   Cloudy   Moderate (2+)   Large (3+)   Positive   30 mg/dL (1+)                 Microbiology Results (last 10  days)       ** No results found for the last 240 hours. **            []  Microbiology  []  Radiology  No radiology results for the last 7 days    []  EKG/Telemetry   []  Cardiology/Vascular   []  Pathology  []  Old records  [x]  Other:  Scheduled Meds:   Continuous Infusions:   PRN Meds:.  acetaminophen    atropine    senna-docusate sodium **AND** polyethylene glycol **AND** bisacodyl **AND** bisacodyl    gabapentin    haloperidol    LORazepam **OR** LORazepam **OR** LORazepam    pantoprazole    Polyvinyl Alcohol-Povidone PF      Assessment & Plan   Assessment / Plan     Assessment/Plan:  Failure to thrive  Hypertension  Urinary tract infection  Dementia  Severe protein calorie malnutrition        Patient admitted for further evaluation and treatment  After discussion with patient and family regards to goals of care decision was made to make comfort measures only   consult placed  Continue diet as tolerated for patient comfort.  Continue Ativan for anxiety as needed  Continue Haldol for agitation as needed  Continue gabapentin to as needed  Continue artificial tears as needed  Continue Tylenol as needed for pain  Palliative care consulted thank you for your assistance  Further patient was recommendations pending clinical course       Discussed plan with bedside RN.    Disposition: Family and discharge planning looking for skilled nursing placement with hospice services.  Currently looking at facilities in [place] to be closer to family. Discharge planning coordinating    VTE Prophylaxis:  Mechanical VTE prophylaxis orders are present.        CODE STATUS:   Code Status (Patient has no pulse and is not breathing): No CPR (Do Not Attempt to Resuscitate)  Medical Interventions (Patient has pulse or is breathing): Comfort Measures

## 2024-07-22 NOTE — PLAN OF CARE
Goal Outcome Evaluation:  Plan of Care Reviewed With: patient        Progress: no change  Outcome Evaluation: Patient had no complaints of pain or discomfort, vitals stable, sleeping most of the day.

## 2024-07-22 NOTE — CONSULTS
07/22/24 1231   Spiritual Care   Use of Spiritual Resources prayer;spirituality for coping, indicated strong use of   Spiritual Care Source physician referral   Spiritual Care Follow-Up follow-up planned regularly for general support   Response to Spiritual Care receptive of support;thanks expressed;emotion expressed;engaged in conversation   Spiritual Care Interventions supportive conversation provided;prayer support provided   Spiritual Care Visit Type follow-up   Spiritual Care Request end-of-life issue(s) support;hospitality support;spiritual/moral support;prayer support   Receptivity to Spiritual Care visit welcomed

## 2024-07-22 NOTE — NURSING NOTE
Palliative Care visit as patient is comfort measures only. Patient appears comfortable at time of visit, no signs/symptoms of distress noted. Patient with a current PPS of 30%. Discussed with unit , awaiting private pay bed at facility. Call placed to patients grandson to provide update and emotional support. Palliative care will continue to follow to support and assist.    Yenny HOSKINS, RN, [facility]  Palliative Care

## 2024-07-23 PROCEDURE — 99231 SBSQ HOSP IP/OBS SF/LOW 25: CPT | Performed by: INTERNAL MEDICINE

## 2024-07-23 NOTE — NURSING NOTE
Patient is currently comfort measures only. At time of visit patient appears comfortable- no verbal/nonverbal signs/symptoms of distress noted. Patient with a current PPS of 30%. Patient is pleasantly confused during visit. Emotional support provided. Awaiting private pay placement for continued end of life care. Palliative care will continue to follow to support and assist.    Yenny HOSKINS, RN, PN  Palliative Care

## 2024-07-23 NOTE — PLAN OF CARE
Goal Outcome Evaluation:  Plan of Care Reviewed With: patient           Outcome Evaluation: Pt is A&O x2/3 withi ntermittent confusion. On comfort care. Pt denied pain or discomfort. Rested most of the night. continue care plan.

## 2024-07-23 NOTE — PROGRESS NOTES
[facility]   Hospitalist Progress Note  Date: 2024  Patient Name: Fermín Junior  : 1940  MRN: [id number]  Date of admission: 2024      Subjective   Subjective     Chief Complaint: Follow-up urinary tract infection    Summary:Fermín Junior is a 84 y.o. male  with a past medical history of dementia, hypertension, anxiety presented to the ED for evaluation of decreased appetite, failure to thrive, not taking his medications.  As per the reports, patient is currently staying at home, unable to perform ADLs and is requiring 2 people assistance.  As per the family, patient is needing 24-hour care and they are unable to provide at home at this time.  POA is patient's grandson and he is now taking care of the patient.  Unable to obtain any history from the patient due to dementia.  He is alert and oriented to self.  Case management has been consulted while the patient was in the ED and had discussion with the family.  Patient was found to have urinary tract infection, patient declined treatment, he refused multiple medications, he refused lab checks.  Clinically he is stable however with no obvious signs or symptoms of worsening infection.  Discussed goals of care with the patient and grandson/POA.  Both agreed they wanted to just pursue comfort measures only at this time.  Palliative care consulted.  MOST form completed.  Working on skilled nursing palliative care placement.    Interval Followup: no new issues resting in bed comfortably d/w son at bedside         Objective   Objective     Vitals:   Temp:  [97.5 °F (36.4 °C)] 97.5 °F (36.4 °C)  Heart Rate:  [75] 75  Resp:  [16] 16  BP: ()/(69) 100/69  Physical Exam   Physical Exam  Vitals reviewed.   Constitutional:       Appearance: He is ill-appearing.   Pulmonary:      Effort: No respiratory distress.   Abdominal:      General: There is no distension.   Neurological:      Mental Status: He is alert. Mental status is at baseline.         Result Review     Result Review:  I have personally reviewed these results and agree with these findings:  [x]  Laboratory  LAB RESULTS:                                        Brief Urine Lab Results  (Last result in the past 365 days)        Color   Clarity   Blood   Leuk Est   Nitrite   Protein   CREAT   Urine HCG        07/09/24 2133 Yellow   Cloudy   Moderate (2+)   Large (3+)   Positive   30 mg/dL (1+)                 Microbiology Results (last 10 days)       ** No results found for the last 240 hours. **            []  Microbiology  []  Radiology  No radiology results for the last 7 days    []  EKG/Telemetry   []  Cardiology/Vascular   []  Pathology  []  Old records  [x]  Other:  Scheduled Meds:   Continuous Infusions:   PRN Meds:.•  acetaminophen  •  atropine  •  senna-docusate sodium **AND** polyethylene glycol **AND** bisacodyl **AND** bisacodyl  •  gabapentin  •  haloperidol  •  LORazepam **OR** LORazepam **OR** LORazepam  •  pantoprazole  •  Polyvinyl Alcohol-Povidone PF      Assessment & Plan   Assessment / Plan     Assessment/Plan:  Failure to thrive  Hypertension  Urinary tract infection  Dementia  Severe protein calorie malnutrition      Plan:  Continue comfort measures only   Continue comfort measure orders and adjust accordingly based on patient's symptoms  Palliative care following       Discussed plan with bedside RN and case management    Disposition: Family and discharge planning looking for SNF placement with hospice services.   working on facilities in [place] to be closer to family.     VTE Prophylaxis:  Mechanical VTE prophylaxis orders are present.        CODE STATUS:   Code Status (Patient has no pulse and is not breathing): No CPR (Do Not Attempt to Resuscitate)  Medical Interventions (Patient has pulse or is breathing): Comfort Measures

## 2024-07-23 NOTE — PLAN OF CARE
Goal Outcome Evaluation:  Plan of Care Reviewed With: patient        Progress: no change  Outcome Evaluation: pt aox2 this shift with intermittent confusion. Refusing comfort medications. skin care provided as ordered. No complaints of pain or discomfort. Refusing to eat. turned Q2. Resting in bed all of shift. POA at bedside today. no complaints at this time

## 2024-07-24 PROCEDURE — 99231 SBSQ HOSP IP/OBS SF/LOW 25: CPT | Performed by: INTERNAL MEDICINE

## 2024-07-24 RX ADMIN — ACETAMINOPHEN 650 MG: 325 TABLET ORAL at 01:24

## 2024-07-24 RX ADMIN — ACETAMINOPHEN 650 MG: 325 TABLET ORAL at 10:57

## 2024-07-24 NOTE — PROGRESS NOTES
[facility]   Hospitalist Progress Note  Date: 2024  Patient Name: Fermín Junior  : 1940  MRN: [id number]  Date of admission: 2024      Subjective   Subjective     Chief Complaint: Follow-up urinary tract infection    Summary:Fermín Junior is a 84 y.o. male  with a past medical history of dementia, hypertension, anxiety presented to the ED for evaluation of decreased appetite, failure to thrive, not taking his medications.  As per the reports, patient is currently staying at home, unable to perform ADLs and is requiring 2 people assistance.  As per the family, patient is needing 24-hour care and they are unable to provide at home at this time.  POA is patient's grandson and he is now taking care of the patient.  Unable to obtain any history from the patient due to dementia.  He is alert and oriented to self.  Case management has been consulted while the patient was in the ED and had discussion with the family.  Patient was found to have urinary tract infection, patient declined treatment, he refused multiple medications, he refused lab checks.  Clinically he is stable however with no obvious signs or symptoms of worsening infection.  Discussed goals of care with the patient and grandson/POA.  Both agreed they wanted to just pursue comfort measures only at this time.  Palliative care consulted.  MOST form completed.  Working on skilled nursing palliative care placement.    Interval Followup: no new issues         Objective   Objective     Vitals:   Temp:  [97.3 °F (36.3 °C)-98.2 °F (36.8 °C)] 97.3 °F (36.3 °C)  Heart Rate:  [73-83] 83  Resp:  [16] 16  BP: (86-92)/(51-61) 92/61  Physical Exam   Physical Exam  Vitals reviewed.   Constitutional:       Appearance: He is ill-appearing.   Pulmonary:      Effort: No respiratory distress.   Abdominal:      General: There is no distension.   Neurological:      Mental Status: He is alert. Mental status is at baseline.         Result Review    Result Review:  I have  personally reviewed these results and agree with these findings:  [x]  Laboratory  LAB RESULTS:                                        Brief Urine Lab Results  (Last result in the past 365 days)        Color   Clarity   Blood   Leuk Est   Nitrite   Protein   CREAT   Urine HCG        07/09/24 2133 Yellow   Cloudy   Moderate (2+)   Large (3+)   Positive   30 mg/dL (1+)                 Microbiology Results (last 10 days)       ** No results found for the last 240 hours. **            []  Microbiology  []  Radiology  No radiology results for the last 7 days    []  EKG/Telemetry   []  Cardiology/Vascular   []  Pathology  []  Old records  [x]  Other:  Scheduled Meds:   Continuous Infusions:   PRN Meds:.  acetaminophen    atropine    senna-docusate sodium **AND** polyethylene glycol **AND** bisacodyl **AND** bisacodyl    gabapentin    haloperidol    pantoprazole    Polyvinyl Alcohol-Povidone PF      Assessment & Plan   Assessment / Plan     Assessment/Plan:  Failure to thrive  Hypertension  Urinary tract infection  Dementia  Severe protein calorie malnutrition      Plan:  Continue current comfort measures. adjust accordingly based on patient's symptoms  Palliative care following       Discussed plan with bedside RN and case management    Disposition: Family and discharge planning looking for SNF placement with hospice services.   working on facilities in [place] to be closer to family.     VTE Prophylaxis:  Mechanical VTE prophylaxis orders are present.        CODE STATUS:   Code Status (Patient has no pulse and is not breathing): No CPR (Do Not Attempt to Resuscitate)  Medical Interventions (Patient has pulse or is breathing): Comfort Measures

## 2024-07-24 NOTE — NURSING NOTE
Patient is currently comfort measures only. Patient appears comfortable at time of visit no verbal/nonverbal signs/symptoms of distress noted. Patient requesting milk to drink during visit- provided per patient request. Emotional support provided. Patient to remain inpatient with continued comfort measures only/end of life care until private pay placement is obtained for facility placement. Palliative care will continue to follow to support and assist.    Yenny HOSKINS, RN, CHPN  Palliative Care

## 2024-07-24 NOTE — SIGNIFICANT NOTE
Wound Eval / Progress Noted    HIGINIO Horan     Patient Name: Fermín Junior  : 1940  MRN: [id number]  Today's Date: 2024                 Admit Date: 2024    Visit Dx:    ICD-10-CM ICD-9-CM   1. Acute cystitis without hematuria  N30.00 595.0   2. Decreased activities of daily living (ADL)  Z78.9 V49.89   3. Difficulty in walking  R26.2 719.7         UTI (urinary tract infection)    Severe malnutrition        Past Medical History:   Diagnosis Date    Dementia         History reviewed. No pertinent surgical history.      Physical Assessment:         24 1045   Skin   Skin WDL X;color;characteristics;all   Skin Color/Characteristics redness blanchable;other (see comments)  (buttocks / sacrum. MATEO, RN, [facility])   Skin Temperature warm   Skin Moisture dry   Skin Elasticity slow return to original state   Skin Integrity intact    Buttocks    Wound Check / Follow-up: Patient seen today for wound check and dressing change.  Patient is awake and alert, patient is nonverbal; patient is currently comfort measures only.    Buttocks with linear blanchable redness to the bilateral gluteal aspects.  Tissue is intact and dry.  Cleanse with normal saline.  Recommend continue current skin care with the application of blue top and or stop moisture barrier 3 times daily and as needed for any incontinence.  Continue with every 2 hours turns and offloading at all times.  Keep patient clean and free from all moisture.    Impression: Blanchable redness to buttocks    Short term goals: Regain skin integrity, skin protection, moisture prevention, pressure reduction, topical treatment, quality skin care hygiene.    Martine Mckenzie RN    2024    13:00 EDT

## 2024-07-24 NOTE — PLAN OF CARE
Goal Outcome Evaluation:         Pt. Rested well this shift but requested pain med for headache. Tylenol given crushed in applesauce, good results obtained. Requested milk only and refused snacks. Remains alert and oriented but forgetful. Very pleasant, denies anxiety. Comfort care provided, much emotional and spiritual support shown. Pt stated he was waiting for [facility] to take him home.

## 2024-07-24 NOTE — PLAN OF CARE
Goal Outcome Evaluation:  Plan of Care Reviewed With: patient        Progress: no change  Outcome Evaluation: pt aox2 this shif, intermittent confusion still. Pt c/o of headache, medicated PRN. Pt did not like tylenol crushed in pudding or applesauce and refused  to take all of the medication. Pt drinking 2% milk this shift frequently. Refusing to eat. Turned Q2, skin care provided as ordered. Resting in bed all of shift. POA at bedside. no complaints at this time

## 2024-07-25 PROCEDURE — 99231 SBSQ HOSP IP/OBS SF/LOW 25: CPT | Performed by: INTERNAL MEDICINE

## 2024-07-25 NOTE — PROGRESS NOTES
[facility]   Hospitalist Progress Note  Date: 2024  Patient Name: Fermín Junior  : 1940  MRN: [id number]  Date of admission: 2024      Subjective   Subjective     Chief Complaint: Follow-up urinary tract infection    Summary:Fermín Junior is a 84 y.o. male  with a past medical history of dementia, hypertension, anxiety presented to the ED for evaluation of decreased appetite, failure to thrive, not taking his medications.  As per the reports, patient is currently staying at home, unable to perform ADLs and is requiring 2 people assistance.  As per the family, patient is needing 24-hour care and they are unable to provide at home at this time.  POA is patient's grandson and he is now taking care of the patient.  Unable to obtain any history from the patient due to dementia.  He is alert and oriented to self.  Case management has been consulted while the patient was in the ED and had discussion with the family.  Patient was found to have urinary tract infection, patient declined treatment, he refused multiple medications, he refused lab checks.  Clinically he is stable however with no obvious signs or symptoms of worsening infection.  Discussed goals of care with the patient and grandson/POA.  Both agreed they wanted to just pursue comfort measures only at this time.  Palliative care consulted.  MOST form completed.  Working on skilled nursing palliative care placement.    Interval Followup: reamins without new issues         Objective   Objective     Vitals:   Temp:  [97.3 °F (36.3 °C)-98.4 °F (36.9 °C)] 97.3 °F (36.3 °C)  Heart Rate:  [66-79] 66  Resp:  [16-18] 16  BP: (93-94)/(54-58) 93/54  Physical Exam   Physical Exam  Vitals reviewed.   Constitutional:       Appearance: He is ill-appearing.   Pulmonary:      Effort: No respiratory distress.   Abdominal:      General: There is no distension.   Neurological:      Mental Status: He is alert.         Result Review    Result Review:  I have personally  reviewed these results and agree with these findings:  [x]  Laboratory  LAB RESULTS:                                        Brief Urine Lab Results  (Last result in the past 365 days)        Color   Clarity   Blood   Leuk Est   Nitrite   Protein   CREAT   Urine HCG        07/09/24 2133 Yellow   Cloudy   Moderate (2+)   Large (3+)   Positive   30 mg/dL (1+)                 Microbiology Results (last 10 days)       ** No results found for the last 240 hours. **            []  Microbiology  []  Radiology  No radiology results for the last 7 days    []  EKG/Telemetry   []  Cardiology/Vascular   []  Pathology  []  Old records  [x]  Other:  Scheduled Meds:   Continuous Infusions:   PRN Meds:.  acetaminophen    atropine    senna-docusate sodium **AND** polyethylene glycol **AND** bisacodyl **AND** bisacodyl    gabapentin    haloperidol    pantoprazole    Polyvinyl Alcohol-Povidone PF      Assessment & Plan   Assessment / Plan     Assessment/Plan:  Failure to thrive  Hypertension  Urinary tract infection  Dementia  Severe protein calorie malnutrition      Plan:  Continue comfort care.  adjust accordingly based on patient's symptoms  Palliative care following       Discussed plan with bedside RN and case management    Disposition: Family and discharge planning looking for SNF placement with hospice services.   working on facilities in [place] to be closer to family.     VTE Prophylaxis:  Mechanical VTE prophylaxis orders are present.        CODE STATUS:   Code Status (Patient has no pulse and is not breathing): No CPR (Do Not Attempt to Resuscitate)  Medical Interventions (Patient has pulse or is breathing): Comfort Measures

## 2024-07-25 NOTE — NURSING NOTE
"Palliative care visit as patient is comfort measures only. At time of visit patient appears comfortable with no verbal/nonverbal signs/symptoms of distress noted. Patient with a current PPS of 30%. Patient reporting milk \"tastes funny.\" Updated by unit  regarding placement- spoke with patients susie, palmer Ramirez. Provided update and patients grandson reports although bed offer is semi-private he will likely be discharging to facility tomorrow with private pay for continued comfort measures only. Palliative care will continue to follow to support and assist.    Yenny HOSKINS, RN, PN  Palliative Care    "

## 2024-07-25 NOTE — PLAN OF CARE
Goal Outcome Evaluation:  Plan of Care Reviewed With: patient        Progress: no change  Outcome Evaluation: pt aox2 this shift with confusion still. Refusing to take anything PO other than 2% milk. Pt resting in bed, skin care performed as ordered and turned Q2. POA at bedside some of the shift. Resting in bed all of the shift. Plans to possibly discharge tomorrow to a nursing facility with hospice. No complaints at this time

## 2024-07-25 NOTE — CONSULTS
Discharge Planning Assessment  [facility]     Patient Name: Fermín Junior  MRN: [id number]  Today's Date: 7/25/2024    Admit Date: 7/9/2024     Discharge Plan       Row Name 07/25/24 1423       Plan    Plan SW spoke with Muna at [facility] who reports she spoke with pt's grandson, James, and he has agreed to the private pay amount. Bed available tomorrow. Will notify Physician.    Patient/Family in Agreement with Plan yes             Continued Care and Services - Admitted Since 7/9/2024       Destination       Service Provider Request Status Selected Services Address Phone Fax Patient Preferred    [facility] Accepted N/A [address] , [facility] [id number] [phone] --                WILMER Conrad

## 2024-07-26 VITALS
HEART RATE: 74 BPM | TEMPERATURE: 97.3 F | RESPIRATION RATE: 18 BRPM | SYSTOLIC BLOOD PRESSURE: 91 MMHG | OXYGEN SATURATION: 100 % | BODY MASS INDEX: 17.99 KG/M2 | WEIGHT: 125.66 LBS | HEIGHT: 70 IN | DIASTOLIC BLOOD PRESSURE: 44 MMHG

## 2024-07-26 PROCEDURE — 99239 HOSP IP/OBS DSCHRG MGMT >30: CPT | Performed by: INTERNAL MEDICINE

## 2024-07-26 RX ORDER — MORPHINE SULFATE 100 MG/5ML
5 SOLUTION ORAL
Status: DISCONTINUED | OUTPATIENT
Start: 2024-07-26 | End: 2024-07-26 | Stop reason: HOSPADM

## 2024-07-26 RX ORDER — MORPHINE SULFATE 100 MG/5ML
5 SOLUTION ORAL
Qty: 30 ML | Refills: 0 | Status: SHIPPED | OUTPATIENT
Start: 2024-07-26

## 2024-07-26 RX ORDER — ATROPINE SULFATE 10 MG/ML
1 SOLUTION/ DROPS OPHTHALMIC
Qty: 15 ML | Refills: 0 | Status: SHIPPED | OUTPATIENT
Start: 2024-07-26

## 2024-07-26 RX ORDER — MORPHINE SULFATE 10 MG/5ML
5 SOLUTION ORAL
Status: DISCONTINUED | OUTPATIENT
Start: 2024-07-26 | End: 2024-07-26

## 2024-07-26 RX ADMIN — MORPHINE SULFATE 5 MG: 10 SOLUTION ORAL at 08:42

## 2024-07-26 NOTE — NURSING NOTE
Patient to be discharged today to [facility] with continued comfort measures only/end of life care. Provided update to [facility]. At time of visit patient is comfortable without verbal/nonverbal signs of discomfort. Provided update to patient of DC which pt is thankful for. MOST in chart- please ensure this is sent with patient to facility on discharge. [facility] serves as EMS DNR as well- patient to transport to facility via EMS. Palliative care will continue to follow up as needed.    Yenny HOSKINS, RN, CHPN  Palliative Care

## 2024-07-26 NOTE — DISCHARGE SUMMARY
[facility] Medicine Services  DISCHARGE SUMMARY        Date of Admission: 7/9/2024    Date of Discharge:  7/26/2024    Length of stay:  LOS: 16 days     Presenting Problem:   UTI (urinary tract infection) [N39.0]  Acute cystitis without hematuria [N30.00]    Hospital Course     Active Diagnosis During Hospital Stay/Discharge Diagnoses/Course by Diagnoses:    Failure to thrive  Hypertension  Urinary tract infection  Dementia  Severe protein calorie malnutrition       Active Hospital Problems    Diagnosis  POA    **UTI (urinary tract infection) [N39.0]  Yes    Severe malnutrition [E43]  Yes      Resolved Hospital Problems   No resolved problems to display.         Hospital Course     84 y.o. male  with a past medical history of dementia, hypertension, anxiety presented to the ED for evaluation of decreased appetite, failure to thrive, not taking his medications.  As per the reports, patient is currently staying at home, unable to perform ADLs and is requiring 2 people assistance.  As per the family, patient is needing 24-hour care and they are unable to provide at home at this time.  POA is patient's grandson and he is now taking care of the patient.  Unable to obtain any history from the patient due to dementia.  He is alert and oriented to self.  Case management has been consulted while the patient was in the ED and had discussion with the family.  Patient was found to have urinary tract infection, patient declined treatment, he refused multiple medications, he refused lab checks.  Clinically he is stable however with no obvious signs or symptoms of worsening infection.  Discussed goals of care with the patient and grandson/POA.  Both agreed they wanted to just pursue comfort measures only.      Eventually was accepted by SNF and will be transferred there to and can continue comfort care orders while there.     Procedures Performed: as noted          Consults:   Consults       Date and Time Order Name  "Status Description    7/9/2024 11:10 PM Inpatient Hospitalist Consult                Pertinent  and/or Most Recent Results       Pertinent Test Results:           Invalid input(s): \"NEUTOPHILPCT\"        Invalid input(s): \"LABALBU\", \"PROT\"    Microbiology Results (last 10 days)       ** No results found for the last 240 hours. **                Imaging Results (All)       Procedure Component Value Units Date/Time    CT Head Without Contrast [[id number]] Collected: 07/10/24 2110     Updated: 07/10/24 2114    Narrative:      CT HEAD WO CONTRAST    Date of Exam: 7/10/2024 7:39 PM EDT    Indication: worsening mental status.    Comparison: None available.    Technique: Axial CT images were obtained of the head without contrast administration.  Reconstructed coronal and sagittal images were also obtained. Automated exposure control and iterative construction methods were used.      Findings:  There is no evidence of hemorrhage. There is no mass effect or midline shift. There is diffuse brain atrophy. Periventricular hypodense areas compatible with chronic small ischemia    There is no extracerebral collection.    Ventricles are normal in size and configuration for patient's stated age.     Posterior fossa is within normal limits.    Calvarium and skull base appear intact.  Visualized sinuses show no air fluid levels. Visualized orbits are unremarkable.        Impression:      Impression:  No acute intracranial abnormality        Electronically Signed: Tomasz Harris MD    7/10/2024 9:12 PM EDT    Workstation ID: [id number]    XR Chest 1 View [[id number]] Collected: 07/09/24 1936     Updated: 07/09/24 1939    Narrative:      XR CHEST 1 VW    Date of Exam: 7/9/2024 7:19 PM EDT    Indication: Weak/Dizzy/AMS triage protocol    Comparison: None available.    Findings:  There are no definite airspace consolidations. No hyperinflation. Innumerable tiny punctate bilateral calcified granulomas. There is biapical pleural thickening. No " pleural fluid. No pneumothorax. The pulmonary vasculature appears within normal limits.   The cardiac and mediastinal silhouette appear unremarkable. No acute osseous abnormality identified.       Impression:      Impression:  No acute pulmonary process    Electronically Signed: Tomasz Harris MD    7/9/2024 7:37 PM EDT    Workstation ID: [id number]              Day of Discharge       Condition on Discharge:  guarded     Vital Signs  Temp:  [97.3 °F (36.3 °C)] 97.3 °F (36.3 °C)  Heart Rate:  [74] 74  Resp:  [18] 18  BP: (91-93)/(44-55) 91/44    Physical Exam:  Physical Exam  Vitals reviewed.   Constitutional:       Appearance: He is ill-appearing.   Cardiovascular:      Rate and Rhythm: Normal rate.   Neurological:      Mental Status: He is alert.   Psychiatric:         Behavior: Behavior normal.             Discharge Disposition  Skilled Nursing Facility ([place] - External)    Discharge Medications     Discharge Medications        New Medications        Instructions Start Date   atropine 1 % ophthalmic solution   1 drop, Both Eyes, Every 1 Hour PRN      diphenhydrAMINE 12.5 MG/5ML liquid  Commonly known as: BENADRYL   12.5 mg, Oral, Every 6 Hours PRN      morphine sulfate (concentrate) 10 MG/0.5ML solution oral solution   5 mg, Oral, Every 2 Hours PRN      Polyvinyl Alcohol-Povidone PF 1.4-0.6 % ophthalmic solution  Commonly known as: HYPOTEARS   1 drop, Both Eyes, Every 30 Minutes PRN             Continue These Medications        Instructions Start Date   gabapentin 100 MG capsule  Commonly known as: NEURONTIN   100 mg      pantoprazole 20 MG EC tablet  Commonly known as: PROTONIX   20 mg, Oral      QUEtiapine 50 MG tablet  Commonly known as: SEROquel   1.5 tablets, Oral, Nightly             Stop These Medications      budesonide-formoterol 160-4.5 MCG/ACT inhaler  Commonly known as: SYMBICORT     ferrous gluconate 324 MG tablet  Commonly known as: FERGON     metoprolol succinate XL 25 MG 24 hr tablet  Commonly known  as: TOPROL-XL     sertraline 50 MG tablet  Commonly known as: ZOLOFT              Discharge Diet:   Diet Instructions       Advance Diet As Tolerated -Target Diet: as pt can tolerate      Target Diet: as pt can tolerate            Activity at Discharge:   Activity Instructions       Activity as Tolerated              Follow-up Appointments  No future appointments.      Test Results Pending at Discharge       Risk for Readmission (LACE) Score: 9 (7/26/2024  6:01 AM)        Time: Discharge 36 min with face-to-face history exam, writing of prescriptions, and documenting discharge data including care coordination with the nursing staff.      Electronically signed by Rickie Lundy DO, 07/26/24, 10:08 EDT.      Note disclaimer: At [facility], we believe that sharing information builds trust and better relationships. You are receiving this note because you recently visited [facility]. It is possible you will see health information before a provider has talked with you about it. This kind of information can be easy to misunderstand. To help you fully understand what it means for your health, we urge you to discuss this note with your provider.

## 2024-07-26 NOTE — PLAN OF CARE
Goal Outcome Evaluation:  Plan of Care Reviewed With: patient        Progress: no change  Outcome Evaluation: Pt A&O x1-2 this shift. Pt c/o eye discomfort this shift, administered prn eye drops per MAR to promote comfort. Inserted abraham catheter as ordered for comfort measures. Pt is comfort measures only. Pt will be discharged with hospice today. Fall precaution maintained. No new issues or new needs noted at this time.

## 2024-07-26 NOTE — NURSING NOTE
Spoke to DAMI Ramirez about EMS DNR form not being obtained, discussed full code in transportation to facility. POA is okay with patient being full code for transportation with EMS to [facility].

## 2024-07-26 NOTE — NURSING NOTE
Pharmacy reached out about morphine allergy with unknown reaction. Spoke to James (POA) he stated patient gets a rash on morphine. Reached out to pharmacy and DO to update on situation. Allergy list updated.

## 2024-07-26 NOTE — PLAN OF CARE
Goal Outcome Evaluation:  Plan of Care Reviewed With: patient        Progress: no change  Outcome Evaluation: Patient alert and oriented to self.  PT medicated for pain per mar. Report called to [facility]. DAMI Ramirez notified of transport. Paient discharged with abraham in place clean, dry intact. No new issues at this time
